# Patient Record
Sex: MALE | Race: WHITE | NOT HISPANIC OR LATINO | Employment: FULL TIME | RURAL
[De-identification: names, ages, dates, MRNs, and addresses within clinical notes are randomized per-mention and may not be internally consistent; named-entity substitution may affect disease eponyms.]

---

## 2020-09-14 ENCOUNTER — HISTORICAL (OUTPATIENT)
Dept: ADMINISTRATIVE | Facility: HOSPITAL | Age: 55
End: 2020-09-14

## 2022-03-24 ENCOUNTER — OFFICE VISIT (OUTPATIENT)
Dept: FAMILY MEDICINE | Facility: CLINIC | Age: 57
End: 2022-03-24
Payer: COMMERCIAL

## 2022-03-24 VITALS
OXYGEN SATURATION: 96 % | SYSTOLIC BLOOD PRESSURE: 132 MMHG | HEIGHT: 76 IN | WEIGHT: 254 LBS | TEMPERATURE: 98 F | HEART RATE: 54 BPM | BODY MASS INDEX: 30.93 KG/M2 | RESPIRATION RATE: 20 BRPM | DIASTOLIC BLOOD PRESSURE: 88 MMHG

## 2022-03-24 DIAGNOSIS — J06.9 UPPER RESPIRATORY TRACT INFECTION, UNSPECIFIED TYPE: Primary | ICD-10-CM

## 2022-03-24 DIAGNOSIS — H67.2 OTITIS MEDIA OF LEFT EAR IN DISEASE CLASSIFIED ELSEWHERE: ICD-10-CM

## 2022-03-24 DIAGNOSIS — J02.9 SORE THROAT: ICD-10-CM

## 2022-03-24 PROBLEM — H66.92 LEFT OTITIS MEDIA: Status: ACTIVE | Noted: 2022-03-24

## 2022-03-24 LAB
CTP QC/QA: YES
S PYO RRNA THROAT QL PROBE: NEGATIVE

## 2022-03-24 PROCEDURE — 99213 PR OFFICE/OUTPT VISIT, EST, LEVL III, 20-29 MIN: ICD-10-PCS | Mod: 25,,, | Performed by: NURSE PRACTITIONER

## 2022-03-24 PROCEDURE — 87880 POCT RAPID STREP A: ICD-10-PCS | Mod: QW,,, | Performed by: NURSE PRACTITIONER

## 2022-03-24 PROCEDURE — 3075F SYST BP GE 130 - 139MM HG: CPT | Mod: ,,, | Performed by: NURSE PRACTITIONER

## 2022-03-24 PROCEDURE — 87880 STREP A ASSAY W/OPTIC: CPT | Mod: QW,,, | Performed by: NURSE PRACTITIONER

## 2022-03-24 PROCEDURE — 3008F PR BODY MASS INDEX (BMI) DOCUMENTED: ICD-10-PCS | Mod: ,,, | Performed by: NURSE PRACTITIONER

## 2022-03-24 PROCEDURE — 96372 THER/PROPH/DIAG INJ SC/IM: CPT | Mod: ,,, | Performed by: NURSE PRACTITIONER

## 2022-03-24 PROCEDURE — 3008F BODY MASS INDEX DOCD: CPT | Mod: ,,, | Performed by: NURSE PRACTITIONER

## 2022-03-24 PROCEDURE — 99213 OFFICE O/P EST LOW 20 MIN: CPT | Mod: 25,,, | Performed by: NURSE PRACTITIONER

## 2022-03-24 PROCEDURE — 3079F PR MOST RECENT DIASTOLIC BLOOD PRESSURE 80-89 MM HG: ICD-10-PCS | Mod: ,,, | Performed by: NURSE PRACTITIONER

## 2022-03-24 PROCEDURE — 1159F PR MEDICATION LIST DOCUMENTED IN MEDICAL RECORD: ICD-10-PCS | Mod: ,,, | Performed by: NURSE PRACTITIONER

## 2022-03-24 PROCEDURE — 3079F DIAST BP 80-89 MM HG: CPT | Mod: ,,, | Performed by: NURSE PRACTITIONER

## 2022-03-24 PROCEDURE — 3075F PR MOST RECENT SYSTOLIC BLOOD PRESS GE 130-139MM HG: ICD-10-PCS | Mod: ,,, | Performed by: NURSE PRACTITIONER

## 2022-03-24 PROCEDURE — 1159F MED LIST DOCD IN RCRD: CPT | Mod: ,,, | Performed by: NURSE PRACTITIONER

## 2022-03-24 PROCEDURE — 1160F RVW MEDS BY RX/DR IN RCRD: CPT | Mod: ,,, | Performed by: NURSE PRACTITIONER

## 2022-03-24 PROCEDURE — 96372 PR INJECTION,THERAP/PROPH/DIAG2ST, IM OR SUBCUT: ICD-10-PCS | Mod: ,,, | Performed by: NURSE PRACTITIONER

## 2022-03-24 PROCEDURE — 1160F PR REVIEW ALL MEDS BY PRESCRIBER/CLIN PHARMACIST DOCUMENTED: ICD-10-PCS | Mod: ,,, | Performed by: NURSE PRACTITIONER

## 2022-03-24 RX ORDER — CEFTRIAXONE 1 G/1
1 INJECTION, POWDER, FOR SOLUTION INTRAMUSCULAR; INTRAVENOUS
Status: COMPLETED | OUTPATIENT
Start: 2022-03-24 | End: 2022-03-24

## 2022-03-24 RX ORDER — AZITHROMYCIN 250 MG/1
TABLET, FILM COATED ORAL
Qty: 6 TABLET | Refills: 0 | Status: SHIPPED | OUTPATIENT
Start: 2022-03-24 | End: 2022-03-29

## 2022-03-24 RX ORDER — TRAVOPROST OPHTHALMIC SOLUTION 0.04 MG/ML
SOLUTION OPHTHALMIC
COMMUNITY
Start: 2022-02-28

## 2022-03-24 RX ORDER — DEXAMETHASONE SODIUM PHOSPHATE 4 MG/ML
4 INJECTION, SOLUTION INTRA-ARTICULAR; INTRALESIONAL; INTRAMUSCULAR; INTRAVENOUS; SOFT TISSUE
Status: COMPLETED | OUTPATIENT
Start: 2022-03-24 | End: 2022-03-24

## 2022-03-24 RX ADMIN — DEXAMETHASONE SODIUM PHOSPHATE 4 MG: 4 INJECTION, SOLUTION INTRA-ARTICULAR; INTRALESIONAL; INTRAMUSCULAR; INTRAVENOUS; SOFT TISSUE at 10:03

## 2022-03-24 RX ADMIN — CEFTRIAXONE 1 G: 1 INJECTION, POWDER, FOR SOLUTION INTRAMUSCULAR; INTRAVENOUS at 10:03

## 2022-03-28 NOTE — PROGRESS NOTES
LYUDMILA Le   [address] [address]  [phone]      PATIENT NAME: Javier Hinojosa  : 1965  DATE: 3/24/22  MRN: [id number]      Billing Provider: LYUDMILA Le  Level of Service:   Patient PCP Information     Provider PCP Type    LYUDMILA [facility]          Reason for Visit / Chief Complaint: Sore Throat (Sore throat since Tuesday night.  Thinks he has strep.)       Update PCP  Update Chief Complaint         History of Present Illness / Problem Focused Workflow       57 year old male presents with complaints of sore throat, head congestion for about 2-3 days ago  Denies any known fever  Reports redness in eyes related to drop he uses at bedtime        Review of Systems     Review of Systems   Constitutional: Positive for fatigue. Negative for chills and fever.   HENT: Positive for congestion and sore throat. Negative for ear pain.    Eyes: Positive for redness.   Respiratory: Positive for cough. Negative for shortness of breath.    Cardiovascular: Negative for chest pain.   Gastrointestinal: Negative for abdominal pain, diarrhea and nausea.   Musculoskeletal: Negative for gait problem.   Allergic/Immunologic: Negative for environmental allergies.   Neurological: Negative for dizziness, weakness and headaches.   Psychiatric/Behavioral: Negative for dysphoric mood. The patient is not nervous/anxious.        Medical / Social / Family History     Past Medical History:   Diagnosis Date    Glaucoma        Past Surgical History:   Procedure Laterality Date    SHOULDER SURGERY Right        Social History    reports that he has never smoked. He uses smokeless tobacco. He reports current alcohol use. He reports previous drug use.    Family History  's family history includes Cancer in his mother; Diabetes in his father; Hypertension in his father.    Medications and Allergies     Medications  Outpatient Medications Marked as  Taking for the 3/24/22 encounter (Office Visit) with LYUDMILA Le   Medication Sig Dispense Refill    travoprost (TRAVATAN Z) 0.004 % ophthalmic solution INSERT 1 DROP INTO AFFECTED EYE(S) EVERY EVENING         Allergies  Review of patient's allergies indicates:  No Known Allergies    Physical Examination     Vitals:    03/24/22 0921   BP: 132/88   Pulse: (!) 54   Resp: 20   Temp: 97.9 °F (36.6 °C)     Physical Exam  Constitutional:       General: He is not in acute distress.  HENT:      Head: Normocephalic.      Right Ear: Tympanic membrane normal.      Left Ear: Tympanic membrane normal.      Nose: Congestion present.      Mouth/Throat:      Mouth: Mucous membranes are moist.      Pharynx: Posterior oropharyngeal erythema present.   Eyes:      Extraocular Movements: Extraocular movements intact.   Cardiovascular:      Rate and Rhythm: Tachycardia present.   Pulmonary:      Effort: Pulmonary effort is normal. No respiratory distress.   Abdominal:      General: Bowel sounds are normal.      Palpations: Abdomen is soft.   Musculoskeletal:         General: Normal range of motion.      Cervical back: Normal range of motion.   Skin:     General: Skin is warm.   Neurological:      Mental Status: He is alert and oriented to person, place, and time.   Psychiatric:         Behavior: Behavior normal.           Imaging / Labs     Office Visit on 03/24/2022   Component Date Value Ref Range Status    Rapid Strep A Screen 03/24/2022 Negative  Negative Final     Acceptable 03/24/2022 Yes   Final     X-Ray Shoulder 2 or More Views Left  Narrative: Procedure: XOIC SHOULDER COMPLETE    Indication: Pain in left shoulder     Comparison: No comparisons.    Findings: 2 views of the left shoulder are submitted. No definite  osseous or soft tissue abnormality is identified.  Impression: Impression: Normal left shoulder    [facility] of service: [facility]    This report has been electronically signed by  Adrian Riojas      Assessment and Plan (including Health Maintenance)      Problem List  Smart Sets  Document Outside HM   :    Health Maintenance Due   Topic Date Due    Hepatitis C Screening  Never done    Lipid Panel  Never done    COVID-19 Vaccine (1) Never done    HIV Screening  Never done    TETANUS VACCINE  Never done    Colorectal Cancer Screening  Never done    Shingles Vaccine (1 of 2) Never done    Influenza Vaccine (1) Never done       Problem List Items Addressed This Visit        ENT    Upper respiratory tract infection - Primary    Relevant Medications    azithromycin (Z-CHRIS) 250 MG tablet    Left otitis media    Relevant Medications    azithromycin (Z-CHRIS) 250 MG tablet      Other Visit Diagnoses     Sore throat        Relevant Orders    POCT rapid strep A (Completed)        Treat for URI today. Increase fluids as tolerated  Follow up as needed or if symptoms do not improve within 72 hours  Pt voices understanding and agreement       Signature:  LYUDMILA Le  [address] [place] [address]  [phone]    Date of encounter: 3/24/22

## 2024-04-24 ENCOUNTER — TELEPHONE (OUTPATIENT)
Dept: FAMILY MEDICINE | Facility: CLINIC | Age: 59
End: 2024-04-24
Payer: COMMERCIAL

## 2024-04-24 ENCOUNTER — OFFICE VISIT (OUTPATIENT)
Dept: FAMILY MEDICINE | Facility: CLINIC | Age: 59
End: 2024-04-24
Payer: COMMERCIAL

## 2024-04-24 VITALS
SYSTOLIC BLOOD PRESSURE: 160 MMHG | RESPIRATION RATE: 18 BRPM | OXYGEN SATURATION: 98 % | BODY MASS INDEX: 31.88 KG/M2 | TEMPERATURE: 98 F | HEART RATE: 68 BPM | DIASTOLIC BLOOD PRESSURE: 120 MMHG | WEIGHT: 261.81 LBS | HEIGHT: 76 IN
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
## 2024-04-24 DIAGNOSIS — M25.512 ACUTE PAIN OF LEFT SHOULDER: ICD-10-CM

## 2024-04-24 PROCEDURE — 3077F SYST BP >= 140 MM HG: CPT | Mod: ,,, | Performed by: FAMILY MEDICINE

## 2024-04-24 PROCEDURE — 3080F DIAST BP >= 90 MM HG: CPT | Mod: ,,, | Performed by: FAMILY MEDICINE

## 2024-04-24 PROCEDURE — 1160F RVW MEDS BY RX/DR IN RCRD: CPT | Mod: ,,, | Performed by: FAMILY MEDICINE

## 2024-04-24 PROCEDURE — 3008F BODY MASS INDEX DOCD: CPT | Mod: ,,, | Performed by: FAMILY MEDICINE

## 2024-04-24 PROCEDURE — 99213 OFFICE O/P EST LOW 20 MIN: CPT | Mod: ,,, | Performed by: FAMILY MEDICINE

## 2024-04-24 PROCEDURE — 1159F MED LIST DOCD IN RCRD: CPT | Mod: ,,, | Performed by: FAMILY MEDICINE

## 2024-04-24 RX ORDER — TRAMADOL HYDROCHLORIDE 50 MG/1
50 TABLET ORAL EVERY 8 HOURS PRN
Qty: 21 TABLET | Refills: 0 | Status: SHIPPED | OUTPATIENT
Start: 2024-04-24 | End: 2024-05-01
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
## 2024-04-24 NOTE — TELEPHONE ENCOUNTER
Per provider I called patient with 1 week follow up appointment, for 05/01/24 at 10:20, advised we are working on getting MRI scheduled. Also offered to order Physical Therapy, patient declined at this time. He would like to wait and see what the MRI shows.

## 2024-05-03 ENCOUNTER — HOSPITAL ENCOUNTER (OUTPATIENT)
Dept: RADIOLOGY | Facility: HOSPITAL | Age: 59
Discharge: HOME | End: 2024-05-03
Attending: FAMILY MEDICINE
Payer: COMMERCIAL

## 2024-05-03 DIAGNOSIS — M25.512 ACUTE PAIN OF LEFT SHOULDER: ICD-10-CM

## 2024-05-03 PROCEDURE — 73221 MRI JOINT UPR EXTREM W/O DYE: CPT | Mod: TC,LT

## 2024-05-08 ENCOUNTER — OFFICE VISIT (OUTPATIENT)
Dept: FAMILY MEDICINE | Facility: CLINIC | Age: 59
End: 2024-05-08
Payer: COMMERCIAL

## 2024-05-08 VITALS
OXYGEN SATURATION: 97 % | BODY MASS INDEX: 31.25 KG/M2 | SYSTOLIC BLOOD PRESSURE: 130 MMHG | HEART RATE: 68 BPM | HEIGHT: 76 IN | TEMPERATURE: 99 F | RESPIRATION RATE: 18 BRPM | WEIGHT: 256.63 LBS | DIASTOLIC BLOOD PRESSURE: 98 MMHG

## 2024-05-08 DIAGNOSIS — M25.512 ACUTE PAIN OF LEFT SHOULDER: Primary | ICD-10-CM

## 2024-05-08 PROCEDURE — 3080F DIAST BP >= 90 MM HG: CPT | Mod: ,,, | Performed by: FAMILY MEDICINE

## 2024-05-08 PROCEDURE — 99213 OFFICE O/P EST LOW 20 MIN: CPT | Mod: ,,, | Performed by: FAMILY MEDICINE

## 2024-05-08 PROCEDURE — 1160F RVW MEDS BY RX/DR IN RCRD: CPT | Mod: ,,, | Performed by: FAMILY MEDICINE

## 2024-05-08 PROCEDURE — 3075F SYST BP GE 130 - 139MM HG: CPT | Mod: ,,, | Performed by: FAMILY MEDICINE

## 2024-05-08 PROCEDURE — 3008F BODY MASS INDEX DOCD: CPT | Mod: ,,, | Performed by: FAMILY MEDICINE

## 2024-05-08 PROCEDURE — 1159F MED LIST DOCD IN RCRD: CPT | Mod: ,,, | Performed by: FAMILY MEDICINE

## 2024-05-08 RX ORDER — HYDROCODONE BITARTRATE AND ACETAMINOPHEN 5; 325 MG/1; MG/1
1 TABLET ORAL EVERY 8 HOURS PRN
Qty: 15 TABLET | Refills: 0 | Status: CANCELLED | OUTPATIENT
Start: 2024-05-08 | End: 2024-05-13

## 2024-05-08 RX ORDER — HYDROCODONE BITARTRATE AND ACETAMINOPHEN 7.5; 325 MG/1; MG/1
1 TABLET ORAL EVERY 8 HOURS PRN
Qty: 15 TABLET | Refills: 0 | Status: SHIPPED | OUTPATIENT
Start: 2024-05-08 | End: 2024-05-13

## 2024-05-08 RX ORDER — TRAMADOL HYDROCHLORIDE 50 MG/1
50 TABLET ORAL EVERY 6 HOURS PRN
COMMUNITY

## 2024-05-08 NOTE — PROGRESS NOTES
Health Maintenance Due   Topic Date Due    Hepatitis C Screening  Never done    Lipid Panel  Never done    Pneumococcal Vaccines (Age 0-64) (1 of 2 - PCV) Never done    HIV Screening  Never done    TETANUS VACCINE  Never done    Hemoglobin A1c (Diabetic Prevention Screening)  Never done    Colorectal Cancer Screening  Never done    Shingles Vaccine (1 of 2) Never done    COVID-19 Vaccine (1 - 2023-24 season) Never done     I discussed care gaps with patient and he states he has a Healthy You in July and will get all his vaccines and blood work then.

## 2024-05-08 NOTE — PROGRESS NOTES
Valentin Gregory DO   [facility]  [address] [address] [address]  [phone]      PATIENT NAME: Javier Hinojosa  : 1965  DATE: 24  MRN: [id number]      Billing Provider: Valentin Gregory DO  Level of Service: [place] OFFICE/OUTPT VISIT, EST, LEVL III, 20-29 MIN  Patient PCP Information       Provider PCP Type    Valentin Gregory DO General            Reason for Visit / Chief Complaint: Follow-up (Patient is Javier higgins 58 y/o male here for a 1 week follow-up on Left shoulder pain. He reports he had his MRI done 2024. Patient would like to change his pain medication he states it is not working for the pain.) and Referral (Patient reports he needs a referral to ortho for his left shoulder.)       Update PCP  Update Chief Complaint         History of Present Illness / Problem Focused Workflow     Javier Hinojosa presents to the clinic with Follow-up (Patient is Javier higgins 58 y/o male here for a 1 week follow-up on Left shoulder pain. He reports he had his MRI done 2024. Patient would like to change his pain medication he states it is not working for the pain.) and Referral (Patient reports he needs a referral to ortho for his left shoulder.)     Follow-up  Associated symptoms include arthralgias. Pertinent negatives include no abdominal pain, chest pain, chills, congestion, diaphoresis, fever, headaches, nausea, rash, sore throat or vomiting.       HPI as noted.  Patient denies fevers, chills, palpitations, diaphoresis, dizziness and lightheadedness.    Review of Systems     Review of Systems   Constitutional:  Negative for chills, diaphoresis and fever.   HENT:  Negative for congestion and sore throat.    Respiratory:  Negative for shortness of breath.    Cardiovascular:  Negative for chest pain and palpitations.   Gastrointestinal:  Negative for abdominal pain, constipation, diarrhea, nausea and vomiting.   Genitourinary:  Negative  for dysuria and hematuria.   Musculoskeletal:  Positive for arthralgias.   Skin:  Negative for rash.   Neurological:  Negative for dizziness, light-headedness and headaches.       Medical / Social / Family History     Past Medical History:   Diagnosis Date    Glaucoma        Past Surgical History:   Procedure Laterality Date    ADENOIDECTOMY  1971    SHOULDER SURGERY Right     TONSILLECTOMY  1971       Social History  Mr. Hinojosa  reports that he has been smoking. He has never been exposed to tobacco smoke. His smokeless tobacco use includes snuff. He reports current alcohol use of about 2.0 - 3.0 standard drinks of alcohol per week. He reports that he does not use drugs.    Family History  Mr.'s Hinojosa family history includes Arthritis in his father, maternal grandfather, and maternal grandmother; Cancer in his mother; Diabetes in his father and paternal grandmother; Hypertension in his father.    Medications and Allergies     Medications  Outpatient Medications Marked as Taking for the 5/8/24 encounter (Office Visit) with Valentin Gregory, DO   Medication Sig Dispense Refill    traMADoL (ULTRAM) 50 mg tablet Take 50 mg by mouth every 6 (six) hours as needed for Pain.      travoprost (TRAVATAN Z) 0.004 % ophthalmic solution INSERT 1 DROP INTO AFFECTED EYE(S) EVERY EVENING         Allergies  Review of patient's allergies indicates:  No Known Allergies    Physical Examination     Vitals:    05/08/24 1027   BP: (!) 130/98   Pulse:    Resp:    Temp:      Physical Exam  Constitutional:       General: He is not in acute distress.     Appearance: He is not ill-appearing, toxic-appearing or diaphoretic.   HENT:      Head: Normocephalic and atraumatic.      Right Ear: External ear normal.      Left Ear: External ear normal.      Nose: No congestion or rhinorrhea.      Mouth/Throat:      Mouth: Mucous membranes are moist.      Pharynx: No oropharyngeal exudate or posterior oropharyngeal erythema.   Eyes:      General: No  scleral icterus.        Right eye: No discharge.         Left eye: No discharge.      Pupils: Pupils are equal, round, and reactive to light.   Cardiovascular:      Rate and Rhythm: Normal rate.      Heart sounds: No murmur heard.     No friction rub. No gallop.   Pulmonary:      Effort: No respiratory distress.      Breath sounds: No stridor. No wheezing, rhonchi or rales.   Abdominal:      General: There is no distension.      Tenderness: There is no abdominal tenderness. There is no guarding.   Musculoskeletal:         General: No swelling or deformity.      Left shoulder: Decreased range of motion.      Right lower leg: No edema.      Left lower leg: No edema.   Neurological:      General: No focal deficit present.      Mental Status: He is alert and oriented to person, place, and time.         Assessment and Plan (including Health Maintenance)      Problem List  Smart Sets  Document Outside HM   :    Plan:         Health Maintenance Due   Topic Date Due    Hepatitis C Screening  Never done    Lipid Panel  Never done    Pneumococcal Vaccines (Age 0-64) (1 of 2 - PCV) Never done    HIV Screening  Never done    TETANUS VACCINE  Never done    Hemoglobin A1c (Diabetic Prevention Screening)  Never done    Colorectal Cancer Screening  Never done    Shingles Vaccine (1 of 2) Never done    COVID-19 Vaccine (1 - 2023-24 season) Never done       Problem List Items Addressed This Visit    None  Visit Diagnoses       Acute pain of left shoulder    -  Primary    Relevant Medications    HYDROcodone-acetaminophen (NORCO) 7.5-325 mg per tablet    Other Relevant Orders    Ambulatory referral/consult to Orthopedics   Patient reports that tramadol is not controlling his pain.  Patient given a short course of Norco for relief.  Patient is requesting referral to .  Patient has appointment scheduled for this coming Monday, May 13.  Patient counseled at length to call, return to clinic or present to the ED should symptoms  persist or worsen.  Patient's signals understanding and agreement with the plan of care.     Patient's blood pressure noted to be elevated today.  Patient has been keeping blood pressure log at home.  Patient states that he is recently lost weight.  He would like to continue to pursue lifestyle modification before initiating medication.  Patient will continue to maintain log which he will bring to his next appointment in July.    Health Maintenance Topics with due status: Not Due       Topic Last Completion Date    Influenza Vaccine Not Due       Future Appointments   Date Time Provider Department Center   5/13/2024  3:20 PM Mauri Jordan MD [facility]   7/23/2024 10:40 AM Valentin Gregory DO [facility]            Signature:  Valentin Gregory DO  [facility] [address] [place] [address]  [phone]    Date of encounter: 5/8/24

## 2024-05-13 ENCOUNTER — OFFICE VISIT (OUTPATIENT)
Dept: ORTHOPEDICS | Facility: CLINIC | Age: 59
End: 2024-05-13
Payer: COMMERCIAL

## 2024-05-13 DIAGNOSIS — S46.012A TRAUMATIC COMPLETE TEAR OF LEFT ROTATOR CUFF, INITIAL ENCOUNTER: Primary | ICD-10-CM

## 2024-05-13 DIAGNOSIS — M25.512 ACUTE PAIN OF LEFT SHOULDER: ICD-10-CM

## 2024-05-13 PROCEDURE — 1159F MED LIST DOCD IN RCRD: CPT | Mod: S$GLB,,, | Performed by: ORTHOPAEDIC SURGERY

## 2024-05-13 PROCEDURE — 99204 OFFICE O/P NEW MOD 45 MIN: CPT | Mod: S$GLB,,, | Performed by: ORTHOPAEDIC SURGERY

## 2024-05-13 PROCEDURE — 99213 OFFICE O/P EST LOW 20 MIN: CPT | Mod: PBBFAC | Performed by: ORTHOPAEDIC SURGERY

## 2024-05-13 PROCEDURE — 1160F RVW MEDS BY RX/DR IN RCRD: CPT | Mod: S$GLB,,, | Performed by: ORTHOPAEDIC SURGERY
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
## 2024-05-16 DIAGNOSIS — S46.012A TRAUMATIC COMPLETE TEAR OF LEFT ROTATOR CUFF, INITIAL ENCOUNTER: ICD-10-CM

## 2024-05-16 DIAGNOSIS — Z01.811 PRE-OPERATIVE RESPIRATORY EXAMINATION: ICD-10-CM

## 2024-05-16 DIAGNOSIS — Z01.812 PRE-OPERATIVE LABORATORY EXAMINATION: ICD-10-CM

## 2024-05-16 DIAGNOSIS — Z01.810 PRE-OPERATIVE CARDIOVASCULAR EXAMINATION: Primary | ICD-10-CM

## 2024-05-20 ENCOUNTER — OFFICE VISIT (OUTPATIENT)
Dept: FAMILY MEDICINE | Facility: CLINIC | Age: 59
End: 2024-05-20
Payer: COMMERCIAL

## 2024-05-20 VITALS
HEART RATE: 71 BPM | SYSTOLIC BLOOD PRESSURE: 118 MMHG | OXYGEN SATURATION: 96 % | TEMPERATURE: 98 F | HEIGHT: 76 IN | BODY MASS INDEX: 30.81 KG/M2 | WEIGHT: 253 LBS | RESPIRATION RATE: 16 BRPM | DIASTOLIC BLOOD PRESSURE: 80 MMHG

## 2024-05-20 DIAGNOSIS — Z01.818 ENCOUNTER FOR PRE-OPERATIVE EXAMINATION: Primary | ICD-10-CM

## 2024-05-20 DIAGNOSIS — Z01.812 PRE-OPERATIVE LABORATORY EXAMINATION: ICD-10-CM

## 2024-05-20 LAB
ANION GAP SERPL CALCULATED.3IONS-SCNC: 9 MMOL/L (ref 7–16)
BASOPHILS # BLD AUTO: 0.03 K/UL (ref 0–0.2)
BASOPHILS NFR BLD AUTO: 0.5 % (ref 0–1)
BUN SERPL-MCNC: 14 MG/DL (ref 7–18)
BUN/CREAT SERPL: 13 (ref 6–20)
CALCIUM SERPL-MCNC: 9.5 MG/DL (ref 8.5–10.1)
CHLORIDE SERPL-SCNC: 105 MMOL/L (ref 98–107)
CO2 SERPL-SCNC: 27 MMOL/L (ref 21–32)
CREAT SERPL-MCNC: 1.05 MG/DL (ref 0.7–1.3)
DIFFERENTIAL METHOD BLD: ABNORMAL
EGFR (NO RACE VARIABLE) (RUSH/TITUS): 82 ML/MIN/1.73M2
EKG 12-LEAD: NORMAL
EOSINOPHIL # BLD AUTO: 0.13 K/UL (ref 0–0.5)
EOSINOPHIL NFR BLD AUTO: 2.3 % (ref 1–4)
ERYTHROCYTE [DISTWIDTH] IN BLOOD BY AUTOMATED COUNT: 13.2 % (ref 11.5–14.5)
GLUCOSE SERPL-MCNC: 88 MG/DL (ref 74–106)
HCT VFR BLD AUTO: 48.9 % (ref 40–54)
HGB BLD-MCNC: 15.6 G/DL (ref 13.5–18)
IMM GRANULOCYTES # BLD AUTO: 0.01 K/UL (ref 0–0.04)
IMM GRANULOCYTES NFR BLD: 0.2 % (ref 0–0.4)
LYMPHOCYTES # BLD AUTO: 1.29 K/UL (ref 1–4.8)
LYMPHOCYTES NFR BLD AUTO: 23.2 % (ref 27–41)
MCH RBC QN AUTO: 29.4 PG (ref 27–31)
MCHC RBC AUTO-ENTMCNC: 31.9 G/DL (ref 32–36)
MCV RBC AUTO: 92.3 FL (ref 80–96)
MONOCYTES # BLD AUTO: 0.34 K/UL (ref 0–0.8)
MONOCYTES NFR BLD AUTO: 6.1 % (ref 2–6)
MPC BLD CALC-MCNC: 11.8 FL (ref 9.4–12.4)
NEUTROPHILS # BLD AUTO: 3.77 K/UL (ref 1.8–7.7)
NEUTROPHILS NFR BLD AUTO: 67.7 % (ref 53–65)
NRBC # BLD AUTO: 0 X10E3/UL
NRBC, AUTO (.00): 0 %
PLATELET # BLD AUTO: 203 K/UL (ref 150–400)
POTASSIUM SERPL-SCNC: 4 MMOL/L (ref 3.5–5.1)
PR INTERVAL: 158
PRT AXES: NORMAL
QRS DURATION: 104
QT/QTC: NORMAL
RBC # BLD AUTO: 5.3 M/UL (ref 4.6–6.2)
SODIUM SERPL-SCNC: 137 MMOL/L (ref 136–145)
VENTRICULAR RATE: 58
WBC # BLD AUTO: 5.57 K/UL (ref 4.5–11)

## 2024-05-20 PROCEDURE — 3079F DIAST BP 80-89 MM HG: CPT | Mod: ,,, | Performed by: NURSE PRACTITIONER

## 2024-05-20 PROCEDURE — 1160F RVW MEDS BY RX/DR IN RCRD: CPT | Mod: ,,, | Performed by: NURSE PRACTITIONER

## 2024-05-20 PROCEDURE — 85025 COMPLETE CBC W/AUTO DIFF WBC: CPT | Mod: ,,, | Performed by: CLINICAL MEDICAL LABORATORY

## 2024-05-20 PROCEDURE — 93005 ELECTROCARDIOGRAM TRACING: CPT | Mod: ,,, | Performed by: NURSE PRACTITIONER

## 2024-05-20 PROCEDURE — 3008F BODY MASS INDEX DOCD: CPT | Mod: ,,, | Performed by: NURSE PRACTITIONER

## 2024-05-20 PROCEDURE — 1159F MED LIST DOCD IN RCRD: CPT | Mod: ,,, | Performed by: NURSE PRACTITIONER

## 2024-05-20 PROCEDURE — 3074F SYST BP LT 130 MM HG: CPT | Mod: ,,, | Performed by: NURSE PRACTITIONER

## 2024-05-20 PROCEDURE — 80048 BASIC METABOLIC PNL TOTAL CA: CPT | Mod: ,,, | Performed by: CLINICAL MEDICAL LABORATORY

## 2024-05-20 PROCEDURE — 99213 OFFICE O/P EST LOW 20 MIN: CPT | Mod: ,,, | Performed by: NURSE PRACTITIONER

## 2024-05-20 NOTE — ASSESSMENT & PLAN NOTE
According to NS QIP risk calculator patient presents below average risk of complications from surgery.

## 2024-05-20 NOTE — PROGRESS NOTES
Sudha Kate NP   [place] [id number]  [place]  [address]      PATIENT NAME: Javier Hinojosa  : 1965  DATE: 24  MRN: [id number]      Billing Provider: Sudha Kate NP  Level of Service: [place] OFFICE/OUTPT VISIT, EST, LEVL III, 20-29 MIN  Patient PCP Information       Provider PCP Type    Valentin Gregory DO General            Reason for Visit / Chief Complaint: Pre-op Exam (Surgical Clearance: Pt is scheduled to have a left rotator cuff repair surgery with Dr. Jordan on 2024.)         History of Present Illness / Problem Focused Workflow     59 year old male presents to clinic for preop exam/surgical clearance. He is scheduled to have a left rotator cuff repair surgery with Dr. Jordan on 2024.            Review of Systems     @Review of Systems   Constitutional:  Negative for activity change, appetite change, fatigue and fever.   HENT:  Negative for nasal congestion, ear pain, rhinorrhea, sinus pressure/congestion and sore throat.    Eyes:  Negative for pain, redness, visual disturbance and eye dryness.   Respiratory:  Negative for cough and shortness of breath.    Cardiovascular:  Negative for chest pain and leg swelling.   Gastrointestinal:  Negative for abdominal distention, abdominal pain, constipation and diarrhea.   Endocrine: Negative for cold intolerance, heat intolerance and polyuria.   Genitourinary:  Negative for bladder incontinence, dysuria, frequency and urgency.   Musculoskeletal:  Positive for arthralgias. Negative for gait problem and myalgias.   Integumentary:  Negative for color change, rash and wound.   Allergic/Immunologic: Negative for environmental allergies and food allergies.   Neurological:  Negative for dizziness, weakness, light-headedness and headaches.   Psychiatric/Behavioral:  Negative for behavioral problems and sleep disturbance.        Medical / Social / Family History     Past Medical History:   Diagnosis Date    Glaucoma         Past Surgical History:   Procedure Laterality Date    ADENOIDECTOMY  1971    SHOULDER SURGERY Right 2020    TONSILLECTOMY  1971       Medications and Allergies     Medications  Outpatient Medications Marked as Taking for the 5/20/24 encounter (Office Visit) with Sudha Kate NP   Medication Sig Dispense Refill    traMADoL (ULTRAM) 50 mg tablet Take 50 mg by mouth every 6 (six) hours as needed for Pain.      travoprost (TRAVATAN Z) 0.004 % ophthalmic solution INSERT 1 DROP INTO AFFECTED EYE(S) EVERY EVENING         Allergies  Review of patient's allergies indicates:  No Known Allergies    Physical Examination     Vitals:    05/20/24 1038   BP: 118/80   Pulse: 71   Resp: 16   Temp: 97.8 °F (36.6 °C)     Physical Exam  Vitals and nursing note reviewed.   HENT:      Head: Normocephalic.      Right Ear: Tympanic membrane normal.      Left Ear: Tympanic membrane normal.      Nose: Nose normal.      Mouth/Throat:      Mouth: Mucous membranes are moist.      Pharynx: Oropharynx is clear. No posterior oropharyngeal erythema.   Eyes:      Conjunctiva/sclera: Conjunctivae normal.   Cardiovascular:      Rate and Rhythm: Normal rate and regular rhythm.      Pulses: Normal pulses.      Heart sounds: Normal heart sounds.   Pulmonary:      Effort: Pulmonary effort is normal.      Breath sounds: Normal breath sounds.   Abdominal:      General: Abdomen is flat. Bowel sounds are normal. There is no distension.      Palpations: Abdomen is soft.   Musculoskeletal:         General: No swelling.      Left shoulder: Tenderness present. Decreased range of motion.      Cervical back: Normal range of motion.      Right lower leg: No edema.      Left lower leg: No edema.   Skin:     General: Skin is warm and dry.      Capillary Refill: Capillary refill takes less than 2 seconds.   Neurological:      Mental Status: He is alert. Mental status is at baseline.   Psychiatric:         Mood and Affect: Mood normal.         Behavior:  "Behavior normal.               No results found for: "WBC", "HGB", "HCT", "MCV", "PLT"     CMP  No results found for: "NA", "K", "CL", "CO2", "GLU", "BUN", "CREATININE", "CALCIUM", "PROT", "ALBUMIN", "BILITOT", "ALKPHOS", "AST", "ALT", "ANIONGAP", "EGFRNORACEVR"  Procedures   Assessment and Plan (including Health Maintenance)   :    Plan:     Problem List Items Addressed This Visit          Other    Encounter for pre-operative examination - Primary    Current Assessment & Plan     According to NS QIP risk calculator patient presents below average risk of complications from surgery.          Relevant Orders    POCT EKG 12-LEAD (Manually Resulted by Ordering Provider) (Completed)     Other Visit Diagnoses       Pre-operative laboratory examination                Health Maintenance Topics with due status: Not Due       Topic Last Completion Date    Influenza Vaccine Not Due       Future Appointments   Date Time Provider Department Center   7/23/2024 10:40 AM Valentin Gregory, DO [facility]        Health Maintenance Due   Topic Date Due    Hepatitis C Screening  Never done    Lipid Panel  Never done    HIV Screening  Never done    TETANUS VACCINE  Never done    Hemoglobin A1c (Diabetic Prevention Screening)  Never done    Colorectal Cancer Screening  Never done    Shingles Vaccine (1 of 2) Never done    COVID-19 Vaccine (1 - 2023-24 season) Never done          Signature:  Sudha Kate NP  [facility]  [address] [address] [place]  [address]    Date of encounter: 5/20/24    "

## 2024-05-28 ENCOUNTER — ANESTHESIA (OUTPATIENT)
Dept: SURGERY | Facility: HOSPITAL | Age: 59
End: 2024-05-28
Payer: COMMERCIAL

## 2024-05-28 ENCOUNTER — ANESTHESIA EVENT (OUTPATIENT)
Dept: SURGERY | Facility: HOSPITAL | Age: 59
End: 2024-05-28
Payer: COMMERCIAL

## 2024-05-28 ENCOUNTER — HOSPITAL ENCOUNTER (OUTPATIENT)
Facility: HOSPITAL | Age: 59
Discharge: HOME | End: 2024-05-28
Attending: ORTHOPAEDIC SURGERY | Admitting: ORTHOPAEDIC SURGERY
Payer: COMMERCIAL

## 2024-05-28 VITALS
HEIGHT: 77 IN | OXYGEN SATURATION: 97 % | BODY MASS INDEX: 29.52 KG/M2 | WEIGHT: 250 LBS | HEART RATE: 70 BPM | SYSTOLIC BLOOD PRESSURE: 108 MMHG | DIASTOLIC BLOOD PRESSURE: 70 MMHG | TEMPERATURE: 98 F | RESPIRATION RATE: 18 BRPM

## 2024-05-28 DIAGNOSIS — S46.012A TRAUMATIC COMPLETE TEAR OF LEFT ROTATOR CUFF: Primary | ICD-10-CM

## 2024-05-28 PROCEDURE — 37000008 HC ANESTHESIA 1ST 15 MINUTES: Performed by: ORTHOPAEDIC SURGERY

## 2024-05-28 PROCEDURE — 27000510 HC BLANKET BAIR HUGGER ANY SIZE: Performed by: ANESTHESIOLOGY

## 2024-05-28 PROCEDURE — 36000711: Performed by: ORTHOPAEDIC SURGERY

## 2024-05-28 PROCEDURE — 71000015 HC POSTOP RECOV 1ST HR: Performed by: ORTHOPAEDIC SURGERY

## 2024-05-28 PROCEDURE — D9220A PRA ANESTHESIA: Mod: ANES,,, | Performed by: ANESTHESIOLOGY

## 2024-05-28 PROCEDURE — 27000655: Performed by: ANESTHESIOLOGY

## 2024-05-28 PROCEDURE — 36000710: Performed by: ORTHOPAEDIC SURGERY

## 2024-05-28 PROCEDURE — 63600175 PHARM REV CODE 636 W HCPCS

## 2024-05-28 PROCEDURE — 25000003 PHARM REV CODE 250: Performed by: ANESTHESIOLOGY

## 2024-05-28 PROCEDURE — 29824 SHO ARTHRS SRG DSTL CLAVICLC: CPT | Mod: 51,LT,, | Performed by: ORTHOPAEDIC SURGERY

## 2024-05-28 PROCEDURE — 25000003 PHARM REV CODE 250

## 2024-05-28 PROCEDURE — 27000689 HC BLADE LARYNGOSCOPE ANY SIZE: Performed by: ANESTHESIOLOGY

## 2024-05-28 PROCEDURE — 37000009 HC ANESTHESIA EA ADD 15 MINS: Performed by: ORTHOPAEDIC SURGERY

## 2024-05-28 PROCEDURE — 27000165 HC TUBE, ETT CUFFED: Performed by: ANESTHESIOLOGY

## 2024-05-28 PROCEDURE — C1713 ANCHOR/SCREW BN/BN,TIS/BN: HCPCS | Performed by: ORTHOPAEDIC SURGERY

## 2024-05-28 PROCEDURE — 71000033 HC RECOVERY, INTIAL HOUR: Performed by: ORTHOPAEDIC SURGERY

## 2024-05-28 PROCEDURE — 29827 SHO ARTHRS SRG RT8TR CUF RPR: CPT | Mod: LT,,, | Performed by: ORTHOPAEDIC SURGERY

## 2024-05-28 PROCEDURE — 27202344 HC EYESHIELD: Performed by: ANESTHESIOLOGY

## 2024-05-28 PROCEDURE — 27000716 HC OXISENSOR PROBE, ANY SIZE: Performed by: ANESTHESIOLOGY

## 2024-05-28 PROCEDURE — 64415 NJX AA&/STRD BRCH PLXS IMG: CPT | Mod: 59,LT,, | Performed by: ANESTHESIOLOGY

## 2024-05-28 PROCEDURE — 27200750 HC INSULATED NEEDLE/ STIMUPLEX: Performed by: ANESTHESIOLOGY

## 2024-05-28 PROCEDURE — 27201423 OPTIME MED/SURG SUP & DEVICES STERILE SUPPLY: Performed by: ORTHOPAEDIC SURGERY

## 2024-05-28 PROCEDURE — D9220A PRA ANESTHESIA: Mod: CRNA,,,

## 2024-05-28 PROCEDURE — 25000003 PHARM REV CODE 250: Performed by: ORTHOPAEDIC SURGERY

## 2024-05-28 PROCEDURE — 63600175 PHARM REV CODE 636 W HCPCS: Performed by: ANESTHESIOLOGY

## 2024-05-28 PROCEDURE — 63600175 PHARM REV CODE 636 W HCPCS: Mod: JZ,JG | Performed by: ORTHOPAEDIC SURGERY

## 2024-05-28 PROCEDURE — 29822 SHO ARTHRS SRG LMTD DBRDMT: CPT | Mod: 59,51,LT, | Performed by: ORTHOPAEDIC SURGERY

## 2024-05-28 DEVICE — CORKSCREW FT, BC, SUTURETAPE, 4.75MM
Type: IMPLANTABLE DEVICE | Site: SHOULDER | Status: FUNCTIONAL
Brand: ARTHREX®

## 2024-05-28 RX ORDER — PROPOFOL 10 MG/ML
VIAL (ML) INTRAVENOUS
Status: DISCONTINUED | OUTPATIENT
Start: 2024-05-28 | End: 2024-05-28

## 2024-05-28 RX ORDER — HYDROMORPHONE HYDROCHLORIDE 2 MG/ML
0.5 INJECTION, SOLUTION INTRAMUSCULAR; INTRAVENOUS; SUBCUTANEOUS EVERY 5 MIN PRN
Status: DISCONTINUED | OUTPATIENT
Start: 2024-05-28 | End: 2024-05-28 | Stop reason: HOSPADM

## 2024-05-28 RX ORDER — MIDAZOLAM HYDROCHLORIDE 1 MG/ML
INJECTION INTRAMUSCULAR; INTRAVENOUS
Status: DISCONTINUED | OUTPATIENT
Start: 2024-05-28 | End: 2024-05-28

## 2024-05-28 RX ORDER — PROMETHAZINE HYDROCHLORIDE 25 MG/1
25 TABLET ORAL EVERY 6 HOURS PRN
Status: DISCONTINUED | OUTPATIENT
Start: 2024-05-28 | End: 2024-05-28 | Stop reason: HOSPADM

## 2024-05-28 RX ORDER — HYDROCODONE BITARTRATE AND ACETAMINOPHEN 5; 325 MG/1; MG/1
1 TABLET ORAL EVERY 4 HOURS PRN
Status: DISCONTINUED | OUTPATIENT
Start: 2024-05-28 | End: 2024-05-28 | Stop reason: HOSPADM

## 2024-05-28 RX ORDER — KETOROLAC TROMETHAMINE 30 MG/ML
INJECTION, SOLUTION INTRAMUSCULAR; INTRAVENOUS
Status: DISCONTINUED | OUTPATIENT
Start: 2024-05-28 | End: 2024-05-28

## 2024-05-28 RX ORDER — EPINEPHRINE 1 MG/ML
INJECTION, SOLUTION, CONCENTRATE INTRAVENOUS
Status: DISCONTINUED | OUTPATIENT
Start: 2024-05-28 | End: 2024-05-28 | Stop reason: HOSPADM

## 2024-05-28 RX ORDER — ONDANSETRON HYDROCHLORIDE 2 MG/ML
4 INJECTION, SOLUTION INTRAVENOUS DAILY PRN
Status: DISCONTINUED | OUTPATIENT
Start: 2024-05-28 | End: 2024-05-28 | Stop reason: HOSPADM

## 2024-05-28 RX ORDER — PHENYLEPHRINE HYDROCHLORIDE 10 MG/ML
INJECTION INTRAVENOUS
Status: DISCONTINUED | OUTPATIENT
Start: 2024-05-28 | End: 2024-05-28

## 2024-05-28 RX ORDER — SODIUM CHLORIDE, SODIUM LACTATE, POTASSIUM CHLORIDE, CALCIUM CHLORIDE 600; 310; 30; 20 MG/100ML; MG/100ML; MG/100ML; MG/100ML
INJECTION, SOLUTION INTRAVENOUS CONTINUOUS
Status: DISCONTINUED | OUTPATIENT
Start: 2024-05-28 | End: 2024-05-28 | Stop reason: HOSPADM

## 2024-05-28 RX ORDER — LIDOCAINE HYDROCHLORIDE 20 MG/ML
INJECTION, SOLUTION EPIDURAL; INFILTRATION; INTRACAUDAL; PERINEURAL
Status: DISCONTINUED | OUTPATIENT
Start: 2024-05-28 | End: 2024-05-28

## 2024-05-28 RX ORDER — DIPHENHYDRAMINE HYDROCHLORIDE 50 MG/ML
25 INJECTION INTRAMUSCULAR; INTRAVENOUS EVERY 6 HOURS PRN
Status: DISCONTINUED | OUTPATIENT
Start: 2024-05-28 | End: 2024-05-28 | Stop reason: HOSPADM

## 2024-05-28 RX ORDER — OXYCODONE HYDROCHLORIDE 5 MG/1
5 TABLET ORAL
Status: DISCONTINUED | OUTPATIENT
Start: 2024-05-28 | End: 2024-05-28 | Stop reason: HOSPADM

## 2024-05-28 RX ORDER — GLYCOPYRROLATE 0.2 MG/ML
INJECTION INTRAMUSCULAR; INTRAVENOUS
Status: DISCONTINUED | OUTPATIENT
Start: 2024-05-28 | End: 2024-05-28

## 2024-05-28 RX ORDER — HYDROCODONE BITARTRATE AND ACETAMINOPHEN 7.5; 325 MG/15ML; MG/15ML
SOLUTION ORAL EVERY 6 HOURS PRN
COMMUNITY

## 2024-05-28 RX ORDER — ACETAMINOPHEN 500 MG
1000 TABLET ORAL EVERY 6 HOURS PRN
Status: DISCONTINUED | OUTPATIENT
Start: 2024-05-28 | End: 2024-05-28 | Stop reason: HOSPADM

## 2024-05-28 RX ORDER — SODIUM CHLORIDE 9 MG/ML
INJECTION, SOLUTION INTRAVENOUS CONTINUOUS
Status: DISCONTINUED | OUTPATIENT
Start: 2024-05-28 | End: 2024-05-28 | Stop reason: HOSPADM

## 2024-05-28 RX ORDER — BUPIVACAINE HYDROCHLORIDE 2.5 MG/ML
INJECTION, SOLUTION EPIDURAL; INFILTRATION; INTRACAUDAL
Status: DISCONTINUED | OUTPATIENT
Start: 2024-05-28 | End: 2024-05-28 | Stop reason: HOSPADM

## 2024-05-28 RX ORDER — MEPERIDINE HYDROCHLORIDE 25 MG/ML
25 INJECTION INTRAMUSCULAR; INTRAVENOUS; SUBCUTANEOUS EVERY 10 MIN PRN
Status: DISCONTINUED | OUTPATIENT
Start: 2024-05-28 | End: 2024-05-28 | Stop reason: HOSPADM

## 2024-05-28 RX ORDER — FENTANYL CITRATE 50 UG/ML
INJECTION, SOLUTION INTRAMUSCULAR; INTRAVENOUS
Status: DISCONTINUED | OUTPATIENT
Start: 2024-05-28 | End: 2024-05-28

## 2024-05-28 RX ORDER — DEXAMETHASONE SODIUM PHOSPHATE 4 MG/ML
INJECTION, SOLUTION INTRA-ARTICULAR; INTRALESIONAL; INTRAMUSCULAR; INTRAVENOUS; SOFT TISSUE
Status: DISCONTINUED | OUTPATIENT
Start: 2024-05-28 | End: 2024-05-28

## 2024-05-28 RX ORDER — ROCURONIUM BROMIDE 10 MG/ML
INJECTION, SOLUTION INTRAVENOUS
Status: DISCONTINUED | OUTPATIENT
Start: 2024-05-28 | End: 2024-05-28

## 2024-05-28 RX ORDER — ONDANSETRON 4 MG/1
8 TABLET, ORALLY DISINTEGRATING ORAL EVERY 8 HOURS PRN
Status: DISCONTINUED | OUTPATIENT
Start: 2024-05-28 | End: 2024-05-28 | Stop reason: HOSPADM

## 2024-05-28 RX ORDER — MORPHINE SULFATE 10 MG/ML
4 INJECTION INTRAMUSCULAR; INTRAVENOUS; SUBCUTANEOUS EVERY 5 MIN PRN
Status: DISCONTINUED | OUTPATIENT
Start: 2024-05-28 | End: 2024-05-28 | Stop reason: HOSPADM

## 2024-05-28 RX ORDER — ROPIVACAINE HYDROCHLORIDE 7.5 MG/ML
INJECTION, SOLUTION EPIDURAL; PERINEURAL
Status: COMPLETED | OUTPATIENT
Start: 2024-05-28 | End: 2024-05-28

## 2024-05-28 RX ORDER — EPHEDRINE SULFATE 50 MG/ML
INJECTION, SOLUTION INTRAVENOUS
Status: DISCONTINUED | OUTPATIENT
Start: 2024-05-28 | End: 2024-05-28

## 2024-05-28 RX ORDER — HYDROCODONE BITARTRATE AND ACETAMINOPHEN 10; 325 MG/1; MG/1
1 TABLET ORAL EVERY 4 HOURS PRN
Status: DISCONTINUED | OUTPATIENT
Start: 2024-05-28 | End: 2024-05-28 | Stop reason: HOSPADM

## 2024-05-28 RX ORDER — HYDROCODONE BITARTRATE AND ACETAMINOPHEN 10; 325 MG/1; MG/1
1 TABLET ORAL EVERY 6 HOURS PRN
Qty: 28 TABLET | Refills: 0 | Status: SHIPPED | OUTPATIENT
Start: 2024-05-28

## 2024-05-28 RX ORDER — SODIUM CHLORIDE, SODIUM LACTATE, POTASSIUM CHLORIDE, CALCIUM CHLORIDE 600; 310; 30; 20 MG/100ML; MG/100ML; MG/100ML; MG/100ML
125 INJECTION, SOLUTION INTRAVENOUS CONTINUOUS
Status: DISCONTINUED | OUTPATIENT
Start: 2024-05-28 | End: 2024-05-28 | Stop reason: HOSPADM

## 2024-05-28 RX ORDER — VASOPRESSIN 20 [USP'U]/ML
INJECTION, SOLUTION INTRAMUSCULAR; SUBCUTANEOUS
Status: DISCONTINUED | OUTPATIENT
Start: 2024-05-28 | End: 2024-05-28

## 2024-05-28 RX ORDER — CEFAZOLIN SODIUM 1 G/3ML
INJECTION, POWDER, FOR SOLUTION INTRAMUSCULAR; INTRAVENOUS
Status: DISCONTINUED | OUTPATIENT
Start: 2024-05-28 | End: 2024-05-28

## 2024-05-28 RX ORDER — LIDOCAINE HYDROCHLORIDE 10 MG/ML
1 INJECTION INFILTRATION; PERINEURAL ONCE
Status: DISCONTINUED | OUTPATIENT
Start: 2024-05-28 | End: 2024-05-28 | Stop reason: HOSPADM

## 2024-05-28 RX ORDER — ONDANSETRON HYDROCHLORIDE 2 MG/ML
INJECTION, SOLUTION INTRAVENOUS
Status: DISCONTINUED | OUTPATIENT
Start: 2024-05-28 | End: 2024-05-28

## 2024-05-28 RX ADMIN — MIDAZOLAM HYDROCHLORIDE 2 MG: 1 INJECTION, SOLUTION INTRAMUSCULAR; INTRAVENOUS at 10:05

## 2024-05-28 RX ADMIN — PROPOFOL 200 MG: 10 INJECTION, EMULSION INTRAVENOUS at 10:05

## 2024-05-28 RX ADMIN — LIDOCAINE HYDROCHLORIDE 40 MG: 20 INJECTION, SOLUTION INTRAVENOUS at 10:05

## 2024-05-28 RX ADMIN — SODIUM CHLORIDE: 9 INJECTION, SOLUTION INTRAVENOUS at 08:05

## 2024-05-28 RX ADMIN — ROCURONIUM BROMIDE 50 MG: 10 INJECTION, SOLUTION INTRAVENOUS at 10:05

## 2024-05-28 RX ADMIN — FENTANYL CITRATE 100 MCG: 50 INJECTION INTRAMUSCULAR; INTRAVENOUS at 10:05

## 2024-05-28 RX ADMIN — SODIUM CHLORIDE: 9 INJECTION, SOLUTION INTRAVENOUS at 12:05

## 2024-05-28 RX ADMIN — PHENYLEPHRINE HYDROCHLORIDE 100 MCG: 10 INJECTION INTRAVENOUS at 11:05

## 2024-05-28 RX ADMIN — DEXAMETHASONE SODIUM PHOSPHATE 8 MG: 4 INJECTION, SOLUTION INTRA-ARTICULAR; INTRALESIONAL; INTRAMUSCULAR; INTRAVENOUS; SOFT TISSUE at 11:05

## 2024-05-28 RX ADMIN — CEFAZOLIN 2 G: 1 INJECTION, POWDER, FOR SOLUTION INTRAMUSCULAR; INTRAVENOUS; PARENTERAL at 11:05

## 2024-05-28 RX ADMIN — VASOPRESSIN 2 UNITS: 20 INJECTION INTRAVENOUS at 11:05

## 2024-05-28 RX ADMIN — ROPIVACAINE HYDROCHLORIDE 30 ML: 7.5 INJECTION, SOLUTION EPIDURAL; PERINEURAL at 11:05

## 2024-05-28 RX ADMIN — EPHEDRINE SULFATE 25 MG: 50 INJECTION INTRAVENOUS at 11:05

## 2024-05-28 RX ADMIN — VASOPRESSIN 1 UNITS: 20 INJECTION INTRAVENOUS at 11:05

## 2024-05-28 RX ADMIN — ONDANSETRON 8 MG: 2 INJECTION INTRAMUSCULAR; INTRAVENOUS at 11:05

## 2024-05-28 RX ADMIN — VASOPRESSIN 1 UNITS: 20 INJECTION INTRAVENOUS at 12:05

## 2024-05-28 RX ADMIN — KETOROLAC TROMETHAMINE 60 MG: 30 INJECTION, SOLUTION INTRAMUSCULAR at 11:05

## 2024-05-28 RX ADMIN — SODIUM CHLORIDE: 9 INJECTION, SOLUTION INTRAVENOUS at 10:05

## 2024-05-28 RX ADMIN — SUGAMMADEX 200 MG: 100 INJECTION, SOLUTION INTRAVENOUS at 12:05

## 2024-05-28 RX ADMIN — GLYCOPYRROLATE 0.2 MG: 0.2 INJECTION INTRAMUSCULAR; INTRAVENOUS at 12:05

## 2024-05-28 NOTE — ANESTHESIA PROCEDURE NOTES
Intubation    Date/Time: 5/28/2024 11:06 AM    Performed by: Sharad Saenz CRNA  Authorized by: Mac Perez MD    Intubation:     Induction:  Intravenous    Intubated:  Postinduction    Mask Ventilation:  Easy mask    Attempts:  1    Attempted By:  CRNA    Method of Intubation:  Direct    Blade:  Ramsey 2    Laryngeal View Grade: Grade I - full view of cords      Difficult Airway Encountered?: No      Complications:  None    Airway Device:  Oral endotracheal tube    Airway Device Size:  7.5    Style/Cuff Inflation:  Cuffed (inflated to minimal occlusive pressure)    Inflation Amount (mL):  8    Tube secured:  23    Secured at:  The teeth    Placement Verified By:  Capnometry    Complicating Factors:  None    Findings Post-Intubation:  BS equal bilateral and atraumatic/condition of teeth unchanged

## 2024-05-28 NOTE — ANESTHESIA PROCEDURE NOTES
Peripheral Block    Patient location during procedure: OR   Block not for primary anesthetic.  Reason for block: at surgeon's request and post-op pain management   Post-op Pain Location: lt shoulder pain, p op   Start time: 5/28/2024 11:01 AM  Timeout: 5/28/2024 11:00 AM   End time: 5/28/2024 11:04 AM    Staffing  Authorizing Provider: Mac Perez MD  Performing Provider: Mac Perez MD    Staffing  Performed by: Mac Perez MD  Authorized by: Mac Perez MD    Preanesthetic Checklist  Completed: patient identified, IV checked, site marked, risks and benefits discussed, surgical consent, monitors and equipment checked, pre-op evaluation and timeout performed  Peripheral Block  Patient position: sitting  Prep: ChloraPrep  Patient monitoring: heart rate, continuous pulse ox, continuous capnometry, frequent blood pressure checks and cardiac monitor  Block type: interscalene  Laterality: left  Injection technique: single shot  Needle  Needle type: Stimuplex   Needle gauge: 20 G  Needle length: 2 in  Needle localization: nerve stimulator and ultrasound guidance   -ultrasound image captured on disc.  Assessment  Injection assessment: negative aspiration, negative parasthesia and local visualized surrounding nerve  Paresthesia pain: none  Heart rate change: no  Slow fractionated injection: yes  Pain Tolerance: comfortable throughout block  Medications:    Medications: ROPIvacaine (NAROPIN) injection 0.75% - Perineural   30 mL - 5/28/2024 11:02:00 AM

## 2024-05-28 NOTE — ANESTHESIA POSTPROCEDURE EVALUATION
Anesthesia Post Evaluation    Patient: Javier Hinojosa    Procedure(s) Performed: Procedure(s) (LRB):  ARTHROSCOPY, SHOULDER (Left)  DEBRIDEMENT, SHOULDER, ARTHROSCOPIC (Left)  ACROMIOPLASTY, ARTHROSCOPIC (Left)  EXCISION, CLAVICLE, DISTAL (Left)  REPAIR, ROTATOR CUFF (Left)    Final Anesthesia Type: general      Patient location during evaluation: PACU  Patient participation: Yes- Able to Participate  Level of consciousness: awake and sedated  Post-procedure vital signs: reviewed and stable  Pain management: adequate  Airway patency: patent    PONV status at discharge: No PONV  Anesthetic complications: no      Cardiovascular status: blood pressure returned to baseline  Respiratory status: unassisted  Hydration status: euvolemic  Follow-up not needed.              Vitals Value Taken Time   /83 05/28/24 1401   Temp 36.4 °C (97.6 °F) 05/28/24 1318   Pulse 64 05/28/24 1409   Resp 16 05/28/24 1350   SpO2 98 % 05/28/24 1409   Vitals shown include unfiled device data.      Event Time   Out of Recovery 13:47:00         Pain/Hair Score: Hair Score: 10 (5/28/2024  1:50 PM)

## 2024-05-28 NOTE — INTERVAL H&P NOTE
The patient has been examined and the H&P has been reviewed:  I concur with the findings and no changes have occurred since H&P was written.    Surgery risks, benefits and alternative options discussed and understood by patient/family.          Active Hospital Problems    Diagnosis  POA    *Traumatic complete tear of left rotator cuff [S46.012A]  Yes      Resolved Hospital Problems   No resolved problems to display.

## 2024-05-28 NOTE — ANESTHESIA PREPROCEDURE EVALUATION
05/28/2024  Javier Hinojosa is a 59 y.o., male.      Pre-op Assessment    I have reviewed the Patient Summary Reports.     I have reviewed the Nursing Notes. I have reviewed the NPO Status.   I have reviewed the Medications.     Review of Systems  Anesthesia Hx:  No problems with previous Anesthesia                Social:  Non-Smoker, No Alcohol Use       Hematology/Oncology:  Hematology Normal   Oncology Normal                                   EENT/Dental:  EENT/Dental Normal           Cardiovascular:  Cardiovascular Normal                                            Pulmonary:  Pulmonary Normal                       Renal/:  Renal/ Normal                 Hepatic/GI:  Hepatic/GI Normal                 Musculoskeletal:  Musculoskeletal Normal                Neurological:  Neurology Normal                                      Endocrine:  Endocrine Normal            Dermatological:  Skin Normal    Psych:  Psychiatric Normal                    Physical Exam  General: Well nourished    Airway:  Mallampati: II / II  Mouth Opening: Normal  TM Distance: > 6 cm  Tongue: Normal  Neck ROM: Normal ROM    Chest/Lungs:  Clear to auscultation, Normal Respiratory Rate    Heart:  Rate: Normal  Rhythm: Regular Rhythm        Anesthesia Plan  Type of Anesthesia, risks & benefits discussed:    Anesthesia Type: Gen ETT, Regional  Intra-op Monitoring Plan: Standard ASA Monitors  Post Op Pain Control Plan: multimodal analgesia  Induction:  IV  Informed Consent: Informed consent signed with the Patient and all parties understand the risks and agree with anesthesia plan.  All questions answered. Patient consented to blood products? Yes  ASA Score: 2  Day of Surgery Review of History & Physical: H&P Update referred to the surgeon/provider.I have interviewed and examined the patient. I have reviewed the patient's H&P dated: There  are no significant changes.     Ready For Surgery From Anesthesia Perspective.     .

## 2024-05-28 NOTE — BRIEF OP NOTE
[facility] - Orthopedic Periop Services  Brief Operative Note    Surgery Date: 5/28/2024     Surgeons and Role:     * Mauri Jordan MD - Primary    Assisting Surgeon: None    Pre-op Diagnosis:  Traumatic complete tear of left rotator cuff, initial encounter [S46.012A]    Post-op Diagnosis:  Post-Op Diagnosis Codes:     * Traumatic complete tear of left rotator cuff, initial encounter [S46.012A]    Procedure(s) (LRB):  ARTHROSCOPY, SHOULDER (Left)  DEBRIDEMENT, SHOULDER, ARTHROSCOPIC (Left)  ACROMIOPLASTY, ARTHROSCOPIC (Left)  EXCISION, CLAVICLE, DISTAL (Left)  REPAIR, ROTATOR CUFF (Left)    Anesthesia: General/Regional    Description of the findings of the procedure(s): See Op Note     Estimated Blood Loss: * No values recorded between 5/28/2024 11:40 AM and 5/28/2024  1:16 PM *5cc         Specimens:   Specimen (24h ago, onward)      None              Discharge Note    OUTCOME: Patient tolerated treatment/procedure well without complication and is now ready for discharge.    DISPOSITION: Home or Self Care    FINAL DIAGNOSIS:  Traumatic complete tear of left rotator cuff    FOLLOWUP: In clinic    DISCHARGE INSTRUCTIONS:    Discharge Procedure Orders   Diet general     Keep surgical extremity elevated     Ice to affected area   Order Comments: using barrier between ice and skin (specify duration&frequency)     Change dressing (specify)   Order Comments: Dressing change: one time per day beginning 72 hours post op.     Call MD for:  temperature >100.4     Call MD for:  persistent nausea and vomiting     Call MD for:  severe uncontrolled pain     Call MD for:  difficulty breathing, headache or visual disturbances     Call MD for:  redness, tenderness, or signs of infection (pain, swelling, redness, odor or green/yellow discharge around incision site)     Call MD for:  hives     Call MD for:  persistent dizziness or light-headedness     Call MD for:  extreme fatigue     Activity as tolerated     Shower on  day dressing removed (No bath)     Weight bearing as tolerated

## 2024-05-28 NOTE — SUBJECTIVE & OBJECTIVE
Past Medical History:   Diagnosis Date    Glaucoma        Past Surgical History:   Procedure Laterality Date    ADENOIDECTOMY  1971    SHOULDER SURGERY Right 2020    TONSILLECTOMY  1971       Review of patient's allergies indicates:  No Known Allergies    No current facility-administered medications for this encounter.     Current Outpatient Medications   Medication Sig    traMADoL (ULTRAM) 50 mg tablet Take 50 mg by mouth every 6 (six) hours as needed for Pain.    travoprost (TRAVATAN Z) 0.004 % ophthalmic solution INSERT 1 DROP INTO AFFECTED EYE(S) EVERY EVENING     Family History       Problem Relation (Age of Onset)    Arthritis Father, Maternal Grandfather, Maternal Grandmother    Cancer Mother    Diabetes Father, Paternal Grandmother    Hypertension Father          Tobacco Use    Smoking status: Never     Passive exposure: Never    Smokeless tobacco: Current     Types: Snuff    Tobacco comments:     Smokeless tobacco off and on for 40 years   Substance and Sexual Activity    Alcohol use: Yes     Alcohol/week: 2.0 - 3.0 standard drinks of alcohol     Types: 1 - 2 Cans of beer, 1 Drinks containing 0.5 oz of alcohol per week     Comment: Do not drink but about 2x week    Drug use: Never    Sexual activity: Yes     Partners: Female     Birth control/protection: None     Review of Systems   Constitutional: Negative.     Objective:     Vital Signs (Most Recent):    Vital Signs (24h Range):  BP: ()/()   Arterial Line BP: ()/()            There is no height or weight on file to calculate BMI.    No intake or output data in the 24 hours ending 05/28/24 0702     General    Vitals reviewed.  Constitutional: He is oriented to person, place, and time. He appears well-developed and well-nourished.   HENT:   Head: Normocephalic and atraumatic.   Eyes: EOM are normal. Pupils are equal, round, and reactive to light.   Cardiovascular:  Normal rate, regular rhythm and normal heart sounds.            Pulmonary/Chest: Effort  normal and breath sounds normal.   Neurological: He is alert and oriented to person, place, and time.   Psychiatric: He has a normal mood and affect. His behavior is normal.         Right Shoulder Exam   Right shoulder exam is normal.    Left Shoulder Exam     Tenderness   The patient is tender to palpation of the acromioclavicular joint, acromion and greater tuberosity.    Range of Motion   Active abduction:  abnormal   Passive abduction:  normal   Extension:  normal   Adduction: normal    Tests & Signs   Impingement: positive  Rotator Cuff Painful Arc/Range: moderate    Other   Sensation: normal       Muscle Strength   Left Upper Extremity  Shoulder Abduction: 3/5     Vascular Exam       Left Pulses      Radial:                    2+      Capillary Refill  Left Hand: normal capillary refill           Significant Labs: All pertinent labs within the past 24 hours have been reviewed.    Significant Imaging: I have reviewed all pertinent imaging results/findings.
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
## 2024-05-28 NOTE — OR NURSING
1317 Rec'd pt to PACU asleep with oral airway in place. No signs of distress noted, VSS. Left shoulder dressing C/D/I with arm sling in place, cap refill less than 3 seconds, radial pulse 2+. No needs. Will continue to monitor.     1333 Oral airway removed, respirations even and unlabored. Reoriented to surroundings. No needs.     1347 Out of PACU. VSS. No signs of bleeding/distress noted.     1350 Pt to ASC 23 awake and alert. No signs of distress noted, respirations even and unlabored. Family at bedside. Bedside report given to LATISHA Silva RN. Left shoulder dressing C/D/I with arm sling in place, cap refill less than 3 seconds, radial pulse 2+, unable to wiggle fingers on command due to block. Denies pain/needs. /86, P 73, R 14, O2 96% RA.

## 2024-05-28 NOTE — OP NOTE
[facility] - [facility] Services  Surgery Department  Operative Note    SUMMARY     Date of Procedure: 5/28/2024     Procedure: Procedure(s) (LRB):  ARTHROSCOPY, SHOULDER (Left)  DEBRIDEMENT, SHOULDER, ARTHROSCOPIC (Left)  ACROMIOPLASTY, ARTHROSCOPIC (Left)  EXCISION, CLAVICLE, DISTAL (Left)  REPAIR, ROTATOR CUFF (Left)     Surgeons and Role:     * Mauri Jordan MD - Primary    Assisting Surgeon: None    Pre-Operative Diagnosis: Traumatic complete tear of left rotator cuff, initial encounter [S46.012A]    Post-Operative Diagnosis: Post-Op Diagnosis Codes:     * Traumatic complete tear of left rotator cuff, initial encounter [S46.012A]    Anesthesia: General/Regional    Technical Procedures Used:                           DEPARTMENT OF ORTHOPEDIC SURGERY                OPERATIVE REPORT     NAME:  Javier Hinojosa  MRN: [id number]     DATE OF SURGERY:  5/28/2024    PREOPERATIVE DIAGNOSIS: left shoulder internal derangement       POSTOPERATIVE DIAGNOSIS:  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  After adequate level of general anesthesia had been achieved (see anesthesia note) patients left shoulder was examined.  left shoulder normal contour.  There is full passive range of motion of the shoulder without instability signs.  left shoulder and upper extremity was scrubbed with Betadine and draped in sterile fashion.  The operation was begun by inflating the joint with sterile saline through a posterior approach using an 18 gauge spinal needle.  Now a linear skin incision was made over the anterior  aspect of the right shoulder for introduction of the blunt arthroscopy cannula.  Intraarticular positioning was confirmed with the arthroscopy camera. The anterior portal was established though an anterior incision made lateral to the coracoid process. The joint was entered through a trans-subscapularis muscle approach.  Inspection of the glenohumeral joint revealed grade 2/4 DJD involving the humeral head articular surface.  Mild degenerative tearing of the anterior labrum was present.  This was debrided with a shaver and contoured with the radiofrequency Wand.  There was partial-thickness tearing of the long head biceps tendon.  Large full-thickness retracted tear of the supraspinatus/infraspinatus tendons was identified.  Joint was irrigated antibiotic solution and the arthroscopy equipment was withdrawn.  The blunt cannula was redirected from the posterior portal superiorly into the subacromial space.  Anterior and lateral portals were established.  A bursectomy was performed with the shaver.  A large traumatic appearing in substance tear of the supraspinatus and infraspinatus tendons near its insertion was identified.  Now the coracoacromial ligaments taken down with the radiofrequency Wand.  Acromioplasty and Jodi distal clavicle resection was performed with a barrel bur.  Good subacromial decompression was confirmed with multiple portal viewing.  Incision was made to perform an open repair of the rotator cuff tendons.  Arthroscope was withdrawn.  A linear skin incision made over the anterior aspect of the shoulder.  Incision was carried carefully through subcutaneous layers.  Interval between anterior middle 1 thirds of the deltoid muscle was identified in developed gaining access to the subacromial space.  Rotator cuff tear was identified.  The edges were freshened.  Two Arthrex SwiveLock suture anchors were placed in the footprint of the rotator cuff insertion.  Rotator cuff tear was then approximated  using FiberWire suture in the 2 aforementioned suture anchors.  Good opposition of the rotator cuff tissue was achieved with good stability.  Subacromial space was irrigated antibiotic solution.  The deltoid muscle interval was approximated with interrupted 2-0 Vicryl suture.  Subcutaneous tissue was approximated with interrupted 2-0 Vicryl suture.  Skin margins approximated with stainless steel staples.  The wounds dressed sterilely and arm sling applied.  The patient was taken to the recovery room in satisfactory condition.  ESTIMATED BLOOD LOSS:  5ccs.  The patient received Ancef antibiotic intravenously prior to the procedure.      Mauri Jordan MD     Description of the Findings of the Procedure:  Acute retracted rotator cuff tear-left shoulder    Significant Surgical Tasks Conducted by the Assistant(s), if Applicable:     Complications: No    Estimated Blood Loss (EBL): * No values recorded between 5/28/2024 11:40 AM and 5/28/2024  1:16 PM *           Implants:   Implant Name Type Inv. Item Serial No.  Lot No. LRB No. Used Action   ANCHOR CORKSCREW FT [id number] - [id number]  ANCHOR CORKSCREW FT [id number]  [employer] [id number]  1 Implanted   ANCHOR [employer] FT [id number] - [id number]  ANCHOR CORKSCREW FT [id number]  [employer] [id number]  1 Implanted       Specimens:   Specimen (24h ago, onward)      None                    Condition: Good    Disposition: PACU - hemodynamically stable.    Attestation: I was present and scrubbed for the entire procedure.

## 2024-05-28 NOTE — TRANSFER OF CARE
"Anesthesia Transfer of Care Note    Patient: Javier Hinojosa    Procedure(s) Performed: Procedure(s) (LRB):  ARTHROSCOPY, SHOULDER (Left)  DEBRIDEMENT, SHOULDER, ARTHROSCOPIC (Left)  ACROMIOPLASTY, ARTHROSCOPIC (Left)  EXCISION, CLAVICLE, DISTAL (Left)  REPAIR, ROTATOR CUFF    Patient location: PACU    Anesthesia Type: general and regional    Transport from OR: Transported from OR on room air with adequate spontaneous ventilation    Post pain: adequate analgesia    Post assessment: no apparent anesthetic complications    Post vital signs: stable    Level of consciousness: awake, alert and oriented    Nausea/Vomiting: no nausea/vomiting    Complications: none    Transfer of care protocol was followedComments: Refer to nursing note for pain ed score upon discharge from recovery      Last vitals: Visit Vitals  BP (!) 151/93   Pulse 74   Temp 36.4 °C (97.6 °F)   Resp 11   Ht 6' 5" (1.956 m)   Wt 113.4 kg (250 lb)   SpO2 100%   BMI 29.65 kg/m²     "
Pt refusing AM medication/vitals/fingerstick
Pt refusing finger sticks, IV antibiotics to be given, blood pressure medications to be given, vitals to be taken, and BL LE wound skin assessment to be completed
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
## 2024-06-06 DIAGNOSIS — M25.512 ACUTE PAIN OF LEFT SHOULDER: Primary | ICD-10-CM

## 2024-06-07 ENCOUNTER — OFFICE VISIT (OUTPATIENT)
Dept: ORTHOPEDICS | Facility: CLINIC | Age: 59
End: 2024-06-07
Payer: COMMERCIAL

## 2024-06-07 ENCOUNTER — HOSPITAL ENCOUNTER (OUTPATIENT)
Dept: RADIOLOGY | Facility: HOSPITAL | Age: 59
Discharge: HOME | End: 2024-06-07
Payer: COMMERCIAL

## 2024-06-07 VITALS — WEIGHT: 250 LBS | BODY MASS INDEX: 29.52 KG/M2 | HEIGHT: 77 IN

## 2024-06-07 DIAGNOSIS — M25.512 ACUTE PAIN OF LEFT SHOULDER: ICD-10-CM

## 2024-06-07 DIAGNOSIS — Z98.890 STATUS POST LEFT ROTATOR CUFF REPAIR: Primary | ICD-10-CM

## 2024-06-07 PROCEDURE — 99024 POSTOP FOLLOW-UP VISIT: CPT | Mod: S$GLB,,,

## 2024-06-07 PROCEDURE — 99213 OFFICE O/P EST LOW 20 MIN: CPT | Mod: PBBFAC,25

## 2024-06-07 PROCEDURE — 1159F MED LIST DOCD IN RCRD: CPT | Mod: S$GLB,,,

## 2024-06-07 PROCEDURE — 73030 X-RAY EXAM OF SHOULDER: CPT | Mod: 26,LT,, | Performed by: RADIOLOGY

## 2024-06-07 PROCEDURE — 73030 X-RAY EXAM OF SHOULDER: CPT | Mod: TC,LT

## 2024-06-07 NOTE — PROGRESS NOTES
Arthroscopy, Shoulder - Left, Debridement, Shoulder, Arthroscopic - Left, Acromioplasty, Arthroscopic - Left, Excision, Clavicle, Distal - Left, and Repair, Rotator Cuff - Left 5/28/2024    Javier Hinojosa is a 59 y.o. male seen today for post-op visit.  Patient is 10 days postop from left shoulder arthroscopy with open rotator cuff repair by Dr. Jordan.  Patient states he is doing well today.  No complaints today.      PAST MEDICAL HISTORY:   Past Medical History:   Diagnosis Date    Glaucoma         PAST SURGICAL HISTORY:   Past Surgical History:   Procedure Laterality Date    ADENOIDECTOMY  1971    ARTHROSCOPIC ACROMIOPLASTY OF SHOULDER Left 5/28/2024    Procedure: ACROMIOPLASTY, ARTHROSCOPIC;  Surgeon: Mauri Jordan MD;  Location: [facility] OR;  Service: Orthopedics;  Laterality: Left;    ARTHROSCOPIC DEBRIDEMENT OF SHOULDER Left 5/28/2024    Procedure: DEBRIDEMENT, SHOULDER, ARTHROSCOPIC;  Surgeon: Mauri Jordan MD;  Location: [facility] OR;  Service: Orthopedics;  Laterality: Left;    DISTAL CLAVICLE EXCISION Left 5/28/2024    Procedure: EXCISION, CLAVICLE, DISTAL;  Surgeon: Mauri Jordan MD;  Location: [facility] OR;  Service: Orthopedics;  Laterality: Left;    ROTATOR CUFF REPAIR Left 5/28/2024    Procedure: REPAIR, ROTATOR CUFF;  Surgeon: Mauri Jordan MD;  Location: [facility] OR;  Service: Orthopedics;  Laterality: Left;    SHOULDER ARTHROSCOPY Left 5/28/2024    Procedure: ARTHROSCOPY, SHOULDER;  Surgeon: Mauri Jordan MD;  Location: [facility] OR;  Service: Orthopedics;  Laterality: Left;    SHOULDER SURGERY Right 2020    TONSILLECTOMY  1971        MEDICATIONS:   Current Outpatient Medications:     hydrocodone-acetaminophen (HYCET) solution 7.5-325 mg/15mL, Take by mouth every 6 (six) hours as needed for Pain., Disp: , Rfl:     HYDROcodone-acetaminophen (NORCO)  mg per tablet, Take 1 tablet by mouth every 6 (six) hours  as needed for Pain., Disp: 28 tablet, Rfl: 0    traMADoL (ULTRAM) 50 mg tablet, Take 50 mg by mouth every 6 (six) hours as needed for Pain., Disp: , Rfl:     travoprost (TRAVATAN Z) 0.004 % ophthalmic solution, INSERT 1 DROP INTO AFFECTED EYE(S) EVERY EVENING, Disp: , Rfl:        PHYSICAL EXAM:      GENERAL: Well-developed, well-nourished male . The patient is alert, oriented and cooperative.    EXTREMITIES:  Left shoulder with skin clean dry and intact, minimal swelling and erythema around incision sites, good range of motion of hand wrist and elbow, neurovascularly intact    WOUND: Incisions are clean,dry,intact without signs of infection and healing well      RADIOGRAPHIC FINDINGS:   X-Ray Shoulder 2 or More Views Left    Result Date: 6/7/2024  EXAMINATION: XR SHOULDER COMPLETE 2 OR MORE VIEWS LEFT CLINICAL HISTORY: Pain in left shoulder COMPARISON: Left shoulder x-ray May 28, 2024 TECHNIQUE: Frontal and scapular Y-views of the left shoulder. FINDINGS: Suspect prior distal clavicular resection/acromioplasty.  Overlying skin staples similar to prior.  No significant interval change.     No adverse interval change. Point of Service: [facility] Electronically signed by: Juan Wolff Date:    06/07/2024 Time:    10:20    Patient Active Problem List    Diagnosis Date Noted    Encounter for pre-operative examination 05/20/2024    Traumatic complete tear of left rotator cuff 05/13/2024    Upper respiratory tract infection 03/24/2022    Left otitis media 03/24/2022     IMPRESSION AND PLAN: Patient is status-post Arthroscopy, Shoulder - Left, Debridement, Shoulder, Arthroscopic - Left, Acromioplasty, Arthroscopic - Left, Excision, Clavicle, Distal - Left, and Repair, Rotator Cuff - Left doing well.  All incisional sutures and staples removed today and replaced with Steri-Strips, discussed that these can get wet in the shower in 2-3 days, let them fall off on their own.  Personally reviewed x-ray today with  expected postoperative changes of left shoulder, no acute fracture or dislocation.  Discussed with the patient continuing gentle range-of-motion exercises of hand wrist and elbow.  He is going on a trip later this month, we will go ahead and order physical therapy and work around his out of town schedule for physical therapy.  Okay to begin gentle range-of-motion exercises at 4 weeks postop and increased active range of motion exercises 6 weeks postop.  Follow up with Dr. Jordan in 4 weeks, sooner as needed.      No follow-ups on file.       Su Hanson PA-C      (Subject to voice recognition error, transcription service not allowed)

## 2024-06-11 ENCOUNTER — TELEPHONE (OUTPATIENT)
Dept: ORTHOPEDICS | Facility: CLINIC | Age: 59
End: 2024-06-11
Payer: COMMERCIAL

## 2024-06-11 NOTE — TELEPHONE ENCOUNTER
----- Message from Valarie Smith sent at 6/11/2024 10:13 AM CDT -----  Postop pt has developed a rash on back and on lower extremeties x3 days. Pt is itching as bad as poison ivy. Call [phone].  Who Called: Javier Hinojosa    Caller is requesting assistance/information from provider's office.    Symptoms (please be specific):  RASH  How long has patient had these symptoms: 3 days  List of preferred pharmacies on file (remove unneeded): [unfilled]  If different, enter pharmacy into here including location and phone number:       Preferred Method of Contact: Phone Call  Patient's Preferred Phone Number on File: [phone]   Best Call Back Number, if different:  Additional Information:

## 2024-06-11 NOTE — TELEPHONE ENCOUNTER
Called patient to discuss message about rash. Patient states he has not taken any new medications, ate anything different than normal, or changed any laundry or soap in the home but has a rash that has spread under arms and across back. Patient states he has tried a cream on rash. Recommended patient try Benadryl or Zyrtec over the counter and that does not resolve rash, for patient to see PCP for rash. Patient in agreeance and verbalized understanding.

## 2024-07-22 ENCOUNTER — OFFICE VISIT (OUTPATIENT)
Dept: ORTHOPEDICS | Facility: CLINIC | Age: 59
End: 2024-07-22
Payer: COMMERCIAL

## 2024-07-22 DIAGNOSIS — Z09 POSTOP CHECK: Primary | ICD-10-CM

## 2024-07-22 PROCEDURE — 99999 PR PBB SHADOW E&M-EST. PATIENT-LVL III: CPT | Mod: PBBFAC,,, | Performed by: ORTHOPAEDIC SURGERY

## 2024-07-22 PROCEDURE — 1159F MED LIST DOCD IN RCRD: CPT | Mod: S$GLB,,, | Performed by: ORTHOPAEDIC SURGERY

## 2024-07-22 PROCEDURE — 1160F RVW MEDS BY RX/DR IN RCRD: CPT | Mod: S$GLB,,, | Performed by: ORTHOPAEDIC SURGERY

## 2024-07-22 PROCEDURE — 99024 POSTOP FOLLOW-UP VISIT: CPT | Mod: S$GLB,,, | Performed by: ORTHOPAEDIC SURGERY

## 2024-07-22 NOTE — PROGRESS NOTES
CLINIC NOTE       Chief Complaint   Patient presents with    Left Shoulder - Follow-up        Javier Hinojosa is a 59 y.o. male seen today for recheck of his left shoulder.  He is doing exceedingly well at this time.  He underwent arthroscopic subacromial decompression with open repair of a large retracted rotator cuff tear 7 weeks ago on 05/28 2024.  He was postoperative course has been uncomplicated 1 gradual improvement.  He was undergoing physical therapy efforts to restore motion.  He was states he has no significant pain at this time.      Past Medical History:   Diagnosis Date    Glaucoma      Family History   Problem Relation Name Age of Onset    Cancer Mother Jessica Hinojosa     Diabetes Father Vasiliy Hinojosa     Hypertension Father Vasiliy Hinojosa     Arthritis Father Vasiliy Hinojosa     Arthritis Maternal Grandfather Juan Owen     Arthritis Maternal Grandmother Tray Owen     Diabetes Paternal Grandmother Brooke Hinojosa      Current Outpatient Medications on File Prior to Visit   Medication Sig Dispense Refill    hydrocodone-acetaminophen (HYCET) solution 7.5-325 mg/15mL Take by mouth every 6 (six) hours as needed for Pain.      HYDROcodone-acetaminophen (NORCO)  mg per tablet Take 1 tablet by mouth every 6 (six) hours as needed for Pain. 28 tablet 0    traMADoL (ULTRAM) 50 mg tablet Take 50 mg by mouth every 6 (six) hours as needed for Pain.      travoprost (TRAVATAN Z) 0.004 % ophthalmic solution INSERT 1 DROP INTO AFFECTED EYE(S) EVERY EVENING       No current facility-administered medications on file prior to visit.       ROS     There were no vitals filed for this visit.    Past Surgical History:   Procedure Laterality Date    ADENOIDECTOMY  1971    ARTHROSCOPIC ACROMIOPLASTY OF SHOULDER Left 5/28/2024    Procedure: ACROMIOPLASTY, ARTHROSCOPIC;  Surgeon: Mauri Jordan MD;  Location: [facility];  Service: Orthopedics;  Laterality: Left;    ARTHROSCOPIC DEBRIDEMENT OF SHOULDER  Left 5/28/2024    Procedure: DEBRIDEMENT, SHOULDER, ARTHROSCOPIC;  Surgeon: Mauri Jordan MD;  Location: [facility] OR;  Service: Orthopedics;  Laterality: Left;    DISTAL CLAVICLE EXCISION Left 5/28/2024    Procedure: EXCISION, CLAVICLE, DISTAL;  Surgeon: Mauri Jordan MD;  Location: [facility] OR;  Service: Orthopedics;  Laterality: Left;    ROTATOR CUFF REPAIR Left 5/28/2024    Procedure: REPAIR, ROTATOR CUFF;  Surgeon: Mauir Jordan MD;  Location: [facility] OR;  Service: Orthopedics;  Laterality: Left;    SHOULDER ARTHROSCOPY Left 5/28/2024    Procedure: ARTHROSCOPY, SHOULDER;  Surgeon: Mauri Jordan MD;  Location: [facility] OR;  Service: Orthopedics;  Laterality: Left;    SHOULDER SURGERY Right 2020    TONSILLECTOMY  1971        Review of patient's allergies indicates:  No Known Allergies     Ortho Exam : Left shoulder is normal contour.  Healing incision over the anterior lateral aspect of the shoulder.  He was able to abduct 90° independent of scapulothoracic motion.  No crepitance or instability signs.    Radiographic Examination:    Technique:    Findings:    Impression:   See Above    Assessment and Plan  Patient Active Problem List    Diagnosis Date Noted    Encounter for pre-operative examination 05/20/2024    Traumatic complete tear of left rotator cuff 05/13/2024    Upper respiratory tract infection 03/24/2022    Left otitis media 03/24/2022    Impression:  Status post rotator cuff repair left shoulder.    Plan:  Discussed the healing process and maturation of rotator cuff repair.  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  He was not going to return to any such activities at this time.  We discussed slow progression of usage.  He will finish up his physical therapy efforts.  Recheck as needed      Mauri Jordan M.D.

## 2024-07-23 ENCOUNTER — OFFICE VISIT (OUTPATIENT)
Dept: FAMILY MEDICINE | Facility: CLINIC | Age: 59
End: 2024-07-23
Payer: COMMERCIAL

## 2024-07-23 VITALS
RESPIRATION RATE: 18 BRPM | HEIGHT: 77 IN | DIASTOLIC BLOOD PRESSURE: 92 MMHG | TEMPERATURE: 98 F | HEART RATE: 71 BPM | BODY MASS INDEX: 30.32 KG/M2 | WEIGHT: 256.81 LBS | OXYGEN SATURATION: 97 % | SYSTOLIC BLOOD PRESSURE: 140 MMHG

## 2024-07-23 DIAGNOSIS — Z13.220 SCREENING FOR LIPOID DISORDERS: ICD-10-CM

## 2024-07-23 DIAGNOSIS — Z13.1 SCREENING FOR DIABETES MELLITUS: Primary | ICD-10-CM

## 2024-07-23 DIAGNOSIS — Z00.00 ROUTINE GENERAL MEDICAL EXAMINATION AT A HEALTH CARE FACILITY: ICD-10-CM

## 2024-07-23 DIAGNOSIS — Z12.5 ENCOUNTER FOR SCREENING FOR MALIGNANT NEOPLASM OF PROSTATE: ICD-10-CM

## 2024-07-23 LAB
CHOLEST SERPL-MCNC: 260 MG/DL (ref 0–200)
CHOLEST/HDLC SERPL: 6.5 {RATIO}
EST. AVERAGE GLUCOSE BLD GHB EST-MCNC: 108 MG/DL
GLUCOSE SERPL-MCNC: 87 MG/DL (ref 74–106)
HBA1C MFR BLD HPLC: 5.4 % (ref 4.5–6.6)
HDLC SERPL-MCNC: 40 MG/DL (ref 40–60)
LDLC SERPL CALC-MCNC: 177 MG/DL
LDLC/HDLC SERPL: 4.4 {RATIO}
NONHDLC SERPL-MCNC: 220 MG/DL
PSA SERPL-MCNC: 0.56 NG/ML
TRIGL SERPL-MCNC: 213 MG/DL (ref 35–150)
VLDLC SERPL-MCNC: 43 MG/DL

## 2024-07-23 PROCEDURE — G0103 PSA SCREENING: HCPCS | Mod: ,,, | Performed by: CLINICAL MEDICAL LABORATORY

## 2024-07-23 PROCEDURE — 82947 ASSAY GLUCOSE BLOOD QUANT: CPT | Mod: ,,, | Performed by: CLINICAL MEDICAL LABORATORY

## 2024-07-23 PROCEDURE — 3077F SYST BP >= 140 MM HG: CPT | Mod: ,,, | Performed by: FAMILY MEDICINE

## 2024-07-23 PROCEDURE — 80061 LIPID PANEL: CPT | Mod: ,,, | Performed by: CLINICAL MEDICAL LABORATORY

## 2024-07-23 PROCEDURE — 99396 PREV VISIT EST AGE 40-64: CPT | Mod: ,,, | Performed by: FAMILY MEDICINE

## 2024-07-23 PROCEDURE — 1160F RVW MEDS BY RX/DR IN RCRD: CPT | Mod: ,,, | Performed by: FAMILY MEDICINE

## 2024-07-23 PROCEDURE — 3008F BODY MASS INDEX DOCD: CPT | Mod: ,,, | Performed by: FAMILY MEDICINE

## 2024-07-23 PROCEDURE — 3080F DIAST BP >= 90 MM HG: CPT | Mod: ,,, | Performed by: FAMILY MEDICINE

## 2024-07-23 PROCEDURE — 1159F MED LIST DOCD IN RCRD: CPT | Mod: ,,, | Performed by: FAMILY MEDICINE

## 2024-07-23 PROCEDURE — 83036 HEMOGLOBIN GLYCOSYLATED A1C: CPT | Mod: ,,, | Performed by: CLINICAL MEDICAL LABORATORY

## 2024-07-23 NOTE — PROGRESS NOTES
Health Maintenance Due   Topic Date Due    Hepatitis C Screening  Patient Declined    Lipid Panel  Ordered    HIV Screening  Patient Declined    TETANUS VACCINE  Never done    Hemoglobin A1c (Diabetic Prevention Screening)  Ordered    Colorectal Cancer Screening  Patient Declined     Shingles Vaccine (1 of 2) Patient Declined    COVID-19 Vaccine (1 - 2023-24 season) Patient Declined

## 2024-07-23 NOTE — PATIENT INSTRUCTIONS
"Patient Education       Diet and Health   The Basics   Written by the doctors and editors at [facility]   Why is it important to eat a healthy diet? -- It's important to eat a healthy diet because eating the right foods can keep you healthy now and later on in life.  Which foods are especially healthy? -- Foods that are especially healthy include:  Fruits and vegetables - Eating a diet with lots of fruits and vegetables can help prevent heart disease and strokes. It might also help prevent certain types of cancers. Try to eat fruits and vegetables at each meal and also for snacks. If you don't have fresh fruits and vegetables available, you can eat frozen or canned ones instead. Doctors recommend eating at least 2 1/2 servings of vegetables and 2 servings of fruits each day.  Foods with fiber - Eating foods with a lot of fiber can help prevent heart disease and strokes. If you have type 2 diabetes, it can also help control your blood sugar. Foods that have a lot of fiber include vegetables, fruits, beans, nuts, oatmeal, and whole grain breads and cereals. You can tell how much fiber is in a food by reading the nutrition label (figure 1). Doctors recommend eating 25 to 36 grams of fiber each day.  Foods with folate (also called folic acid) - Folate is a vitamin that is important for pregnant people, since it helps prevent certain birth defects. Anyone who could get pregnant should get at least 400 micrograms of folic acid daily, whether or not they are actively trying to get pregnant. Folate is found in many breakfast cereals, oranges, orange juice, and green leafy vegetables.  Foods with calcium and vitamin D - Babies, children, and adults need calcium and vitamin D to help keep their bones strong. Adults also need calcium and vitamin D to help prevent osteoporosis. Osteoporosis is a condition that causes bones to get "thin" and break more easily than usual. Different foods and drinks have calcium and vitamin D in " "them (figure 2). People who don't get enough calcium and vitamin D in their diet might need to take a supplement.  Foods with protein - Protein helps your muscles stay strong. Healthy foods with a lot of protein include chicken, fish, eggs, beans, nuts, and soy products. Red meat also has a lot of protein, but it also contains fats, which can be unhealthy.  Some experts recommend a "Mediterranean diet." This involves eating a lot of fruits, vegetables, nuts, whole grains, and olive oil. It also includes some fish, poultry, and dairy products, but not a lot of red meat. Eating this way can help your overall health, and might even lower your risk of having a stroke.  What foods should I avoid or limit? -- To eat a healthy diet, there are some things you should avoid or limit. They include:   Fats - There are different types of fats. Some types of fats are better for your body than others.  Trans fats are especially unhealthy. They are found in margarines, many fast foods, and some store-bought baked goods. Trans fats can raise your cholesterol level and your increase your chance of getting heart disease. You should avoid eating foods with these types of fats.  The type of "polyunsaturated" fats found in fish seems to be healthy and can reduce your chance of getting heart disease. Other polyunsaturated fats might also be good for your health. When you cook, it's best to use oils with healthier fats, such as olive oil and canola oil.  Sugar - To have a healthy diet, it's important to limit or avoid sugar, sweets, and refined grains. Refined grains are found in white bread, white rice, most forms of pasta, and most packaged "snack" foods. Whole grains, such as whole-wheat bread and brown rice, have more fiber and are better for your health.  Avoiding sugar-sweetened beverages, like soda and sports drinks, can also help improve your health.  Red meat - Studies have shown that eating a lot of red meat can increase your " "risk of certain health problems, including heart disease and cancer. You should limit the amount of red meat that you eat.  Can I drink alcohol as part of a healthy diet? -- People who drink a small amount of alcohol each day might have a lower chance of getting heart disease. But drinking alcohol can lead to problems. For example, it can raise a person's chances of getting liver disease and certain types of cancers. In women, even 1 drink a day can increase the risk of getting breast cancer.  Most doctors recommend that adult women not have more than 1 drink a day and that adult men not have more than 2 drinks a day. The limits are different because most women's bodies take longer to break down alcohol.  How many calories do I need each day? -- The number of calories you need each day depends on your weight, height, age, sex, and how active you are.  Your doctor or nurse can tell you how many calories you should eat each day. If you are trying to lose weight, you should eat fewer calories each day.  What if I have questions? -- If you have questions about which foods you should or should not eat, ask your doctor or nurse. The right diet for you will depend, in part, on your health and any medical conditions you have.  All topics are updated as new evidence becomes available and our peer review process is complete.  This topic retrieved from [employer] on: Sep 21, 2021.  Topic 36834 Version 20.0  Release: 29.4.2 - C29.263  © 2021 UpToDate, Inc. and/or its affiliates. All rights reserved.  figure 1: Nutrition label - fiber     This is an example of a nutrition label. To figure out how much fiber is in a food, look for the line that says "Dietary Fiber." It's also important to look at the serving size. This food has 7 grams of fiber in each serving, and each serving is 1 cup.  Graphic 79890 Version 7.0    figure 2: Foods and drinks with calcium and vitamin D     Foods rich in calcium include ice cream, soy milk, breads, " "kale, broccoli, milk, cheese, cottage cheese, almonds, yogurt, ready-to-eat cereals, beans, and tofu. Foods rich in vitamin D include milk, fortified plant-based "milks" (soy, almond), canned tuna fish, cod liver oil, yogurt, ready-to-eat-cereals, cooked salmon, canned sardines, mackerel, and eggs. Some of these foods are rich in both.  Graphic 15699 Version 4.0    Consumer Information Use and Disclaimer   This information is not specific medical advice and does not replace information you receive from your health care provider. This is only a brief summary of general information. It does NOT include all information about conditions, illnesses, injuries, tests, procedures, treatments, therapies, discharge instructions or life-style choices that may apply to you. You must talk with your health care provider for complete information about your health and treatment options. This information should not be used to decide whether or not to accept your health care provider's advice, instructions or recommendations. Only your health care provider has the knowledge and training to provide advice that is right for you. The use of this information is governed by the [employer] End User License Agreement, available at https://www.[employer]/en/solutions/[employer]/about/lonnie.The use of [employer] content is governed by the [employer] Terms of Use. ©2021 UpToDate, Inc. All rights reserved.  Copyright   © 2021 UpToDate, Inc. and/or its affiliates. All rights reserved.    "

## 2024-07-23 NOTE — PROGRESS NOTES
Subjective     Patient ID: Javier Hinojosa is a 59 y.o. male.    Chief Complaint: Healthy You (Patient is a 59 year old male presenting today for a Healthy You visit. )    HPI      HPI as noted.  Patient denies any questions, concerns or complaints today.  Patient is healing well from recent shoulder surgery.  Review of Systems   Constitutional:  Negative for chills, diaphoresis and fever.   HENT:  Negative for sore throat.    Eyes:  Negative for visual disturbance.   Respiratory:  Negative for cough and shortness of breath.    Cardiovascular:  Negative for chest pain and palpitations.   Gastrointestinal:  Negative for abdominal pain, diarrhea, nausea and vomiting.   Genitourinary:  Negative for dysuria and hematuria.   Musculoskeletal:  Negative for arthralgias and leg pain.   Integumentary:  Negative for rash.   Neurological:  Negative for dizziness, light-headedness, numbness and headaches.       Tobacco Use: High Risk (7/23/2024)    Patient History     Smoking Tobacco Use: Never     Smokeless Tobacco Use: Current     Passive Exposure: Never     Review of patient's allergies indicates:  No Known Allergies  Current Outpatient Medications   Medication Instructions    hydrocodone-acetaminophen (HYCET) solution 7.5-325 mg/15mL Every 6 hours PRN    HYDROcodone-acetaminophen (NORCO)  mg per tablet 1 tablet, Oral, Every 6 hours PRN    traMADoL (ULTRAM) 50 mg, Every 6 hours PRN    travoprost (TRAVATAN Z) 0.004 % ophthalmic solution INSERT 1 DROP INTO AFFECTED EYE(S) EVERY EVENING     There are no discontinued medications.    Past Medical History:   Diagnosis Date    Glaucoma      Health Maintenance Topics with due status: Not Due       Topic Last Completion Date    Influenza Vaccine Not Due       There is no immunization history on file for this patient.    Objective     Body mass index is 30.45 kg/m².  Wt Readings from Last 3 Encounters:   07/23/24 116.5 kg (256 lb 12.8 oz)   06/07/24 113.4 kg (250 lb)   05/28/24  "113.4 kg (250 lb)     Ht Readings from Last 3 Encounters:   07/23/24 6' 5" (1.956 m)   06/07/24 6' 5" (1.956 m)   05/28/24 6' 5" (1.956 m)     BP Readings from Last 3 Encounters:   07/23/24 (!) 140/92   05/28/24 108/70   05/20/24 118/80     Temp Readings from Last 3 Encounters:   07/23/24 97.8 °F (36.6 °C) (Oral)   05/28/24 97.6 °F (36.4 °C)   05/20/24 97.8 °F (36.6 °C) (Oral)     Pulse Readings from Last 3 Encounters:   07/23/24 71   05/28/24 70   05/20/24 71     Resp Readings from Last 3 Encounters:   07/23/24 18   05/28/24 18   05/20/24 16     PF Readings from Last 3 Encounters:   No data found for PF       Physical Exam  Vitals and nursing note reviewed.   Constitutional:       General: He is not in acute distress.     Appearance: He is not ill-appearing, toxic-appearing or diaphoretic.   HENT:      Head: Normocephalic and atraumatic.      Right Ear: External ear normal.      Left Ear: External ear normal.      Nose: Nose normal.      Mouth/Throat:      Pharynx: No oropharyngeal exudate or posterior oropharyngeal erythema.   Eyes:      General: No scleral icterus.        Right eye: No discharge.         Left eye: No discharge.      Extraocular Movements: Extraocular movements intact.   Neck:      Vascular: No carotid bruit.   Cardiovascular:      Rate and Rhythm: Normal rate and regular rhythm.      Pulses: Normal pulses.      Heart sounds: Normal heart sounds. No murmur heard.     No friction rub. No gallop.   Pulmonary:      Effort: Pulmonary effort is normal. No respiratory distress.      Breath sounds: No stridor. No wheezing, rhonchi or rales.   Chest:      Chest wall: No tenderness.   Abdominal:      Palpations: Abdomen is soft.      Tenderness: There is no abdominal tenderness. There is no guarding.   Musculoskeletal:         General: No swelling.      Right lower leg: No edema.      Left lower leg: No edema.   Skin:     General: Skin is warm and dry.   Neurological:      Mental Status: He is alert and " oriented to person, place, and time.         Assessment and Plan     Problem List Items Addressed This Visit    None  Visit Diagnoses       Screening for diabetes mellitus    -  Primary    Relevant Orders    Hemoglobin A1C    Glucose, Fasting    Screening for lipoid disorders        Relevant Orders    Lipid Panel    Encounter for screening for malignant neoplasm of prostate        Relevant Orders    PSA, Screening    Routine general medical examination at a health care facility                Plan:     Instructed patient to keep appts as scheduled.   Instructed on DASH diet and increased exercise. Recommended at least 150 minutes a week with resistance training as tolerated. Monitor weight and try to lose some.   Instructed on importance of taking all medications as prescribed.   Discussed yearly dental and eye exams.    Discussed use of sun screen, helmets and seat belts.  Gun safety discussed  Sleep discussed  Stay away from tobacco products    Discussed and provided with a screening schedule and personal prevention plan in accordance with USPSTF age appropriate recommendations and screening guidelines.   Education given and reviewed with patient. Counseling and referrals were provided as needed.  After Visit Summary printed and given to patient which includes written education and a list of any referrals if indicated.      F/u plan for yearly AWV.        I have reviewed the medications, allergies, and problem list.     Goal Actions:    What type of visit is the patient here for today?: Healthy You  Does the patient consent to enroll in [facility] Healthy?: Yes  Is this a Wellness Follow Up?: Yes  What is your overall wellness goal? (select at least one): Lifestyle modifications  Choose 3: Exercise, Tobacco, Biometric  Biometric Actions: Attend regularly scheduled office visits  Exercise Actions: Recommend physical activity 30 minutes per day 3-5 times/week  Tobacco Actions: Patient will not stop using tobacco  recommend reducing amt of consumption per day

## 2024-07-24 DIAGNOSIS — E78.5 HYPERLIPIDEMIA, UNSPECIFIED HYPERLIPIDEMIA TYPE: Primary | ICD-10-CM

## 2024-07-24 RX ORDER — ATORVASTATIN CALCIUM 20 MG/1
20 TABLET, FILM COATED ORAL DAILY
Qty: 90 TABLET | Refills: 3 | Status: SHIPPED | OUTPATIENT
Start: 2024-07-24 | End: 2025-07-24

## 2024-07-30 RX ORDER — MELOXICAM 15 MG/1
15 TABLET ORAL DAILY
Qty: 30 TABLET | Refills: 1 | Status: SHIPPED | OUTPATIENT
Start: 2024-07-30

## 2024-07-30 NOTE — TELEPHONE ENCOUNTER
Pt requested a Rx for Meloxicam to take for his left shoulder pain. Pt had surgery on his left shoulder in May and is currently undergoing physical therapy. He reports that the pain is not severe but he is sore after doing PT. He stated that he has been on this medication before with no issues.

## 2024-10-21 PROBLEM — Z09 POSTOP CHECK: Status: RESOLVED | Noted: 2024-07-22 | Resolved: 2024-10-21

## 2024-10-23 ENCOUNTER — PATIENT MESSAGE (OUTPATIENT)
Dept: RESEARCH | Facility: HOSPITAL | Age: 59
End: 2024-10-23

## 2025-01-02 ENCOUNTER — OFFICE VISIT (OUTPATIENT)
Dept: FAMILY MEDICINE | Facility: CLINIC | Age: 60
End: 2025-01-02
Payer: COMMERCIAL

## 2025-01-02 VITALS
OXYGEN SATURATION: 96 % | BODY MASS INDEX: 31.69 KG/M2 | HEIGHT: 77 IN | WEIGHT: 268.38 LBS | RESPIRATION RATE: 20 BRPM | DIASTOLIC BLOOD PRESSURE: 100 MMHG | TEMPERATURE: 102 F | SYSTOLIC BLOOD PRESSURE: 156 MMHG | HEART RATE: 100 BPM

## 2025-01-02 DIAGNOSIS — R05.9 COUGH, UNSPECIFIED TYPE: ICD-10-CM

## 2025-01-02 DIAGNOSIS — J10.1 INFLUENZA A: Primary | ICD-10-CM

## 2025-01-02 DIAGNOSIS — R52 BODY ACHES: ICD-10-CM

## 2025-01-02 DIAGNOSIS — R09.81 SINUS CONGESTION: ICD-10-CM

## 2025-01-02 LAB
CTP QC/QA: YES
CTP QC/QA: YES
MOLECULAR STREP A: NEGATIVE
POC MOLECULAR INFLUENZA A AGN: POSITIVE
POC MOLECULAR INFLUENZA B AGN: NEGATIVE

## 2025-01-02 RX ORDER — AZITHROMYCIN 250 MG/1
TABLET, FILM COATED ORAL
Qty: 6 TABLET | Refills: 0 | Status: SHIPPED | OUTPATIENT
Start: 2025-01-02 | End: 2025-01-07

## 2025-01-02 NOTE — ASSESSMENT & PLAN NOTE
Positive for Influenza A. Discussed use of Tamiflu and he declined. Given that it is near the weekend he is requesting antibiotic be sent in in case symptoms do not improve after course of virus. I sent in Zithromax per his request but advised it would be of no benefit in the viral process.     Reviewed pathology of current symptoms, medication side effects/risk/benefits/directions on taking medications, instructed to alternate OTC Tylenol/Motrin for fever/pain, increase fluid intake, monitor for discussed worsening symptoms that require re-evaluation in clinic or if after hours go to ER. Strict hand hygiene encouraged. Lysol surroundings. Patient verbalized understanding of treatment plan and denies any questions.

## 2025-01-02 NOTE — PROGRESS NOTES
Sudha Kate NP   [facility]  [address]  [place]  [address]      PATIENT NAME: Javier Hinojosa  : 1965  DATE: 25  MRN: [id number]      Billing Provider: Sudha Kate NP  Level of Service: MA OFFICE/OUTPT VISIT, EST, LEVL III, 20-29 MIN  Patient PCP Information       Provider PCP Type    Valentin Gregory DO General            Reason for Visit / Chief Complaint: Fever (Patient presents to clinic for fever ongoing since Tuesday. He has taken Tylenol/ Ibuprofen but has not been effective in treating fever. ), Generalized Body Aches (Patient c/o body aches since yesterday. ), and Cough (Patient present to clinic for cough ongoing since Wednesday. He has done some home remedies but neither are effective. )         History of Present Illness / Problem Focused Workflow     59 year old male presents to clinic with complaints of fever, generalized body aches, and cough.   Fever: Patient presents to clinic for fever ongoing since Tuesday. He has taken Tylenol/ Ibuprofen but as soon as it wears off fever is back.  Generalized Body Aches: Patient c/o body aches since yesterday.   Cough: Patient present to clinic for cough ongoing since Wednesday. He has done some home remedies but neither are effective.         Fever   This is a new problem. The current episode started yesterday. The problem occurs 2 to 4 times per day. The problem has been gradually worsening. The maximum temperature noted was 102 to 102.9 F. The temperature was taken using an oral thermometer. Associated symptoms include coughing, headaches, muscle aches, a sore throat and wheezing. Pertinent negatives include no abdominal pain, chest pain, congestion, diarrhea or ear pain. He has tried NSAIDs, acetaminophen and fluids for the symptoms. The treatment provided moderate relief.   Cough  Associated symptoms include a fever, headaches, a sore throat and wheezing. Pertinent negatives include no chest pain or ear pain.        Review of Systems     @Review of Systems   Constitutional:  Positive for fever.   HENT:  Positive for sore throat. Negative for nasal congestion and ear pain.    Respiratory:  Positive for cough and wheezing.    Cardiovascular:  Negative for chest pain.   Gastrointestinal:  Negative for abdominal pain and diarrhea.   Neurological:  Positive for headaches.       Medical / Social / Family History     Past Medical History:   Diagnosis Date    Glaucoma        Past Surgical History:   Procedure Laterality Date    ADENOIDECTOMY  1971    ARTHROSCOPIC ACROMIOPLASTY OF SHOULDER Left 5/28/2024    Procedure: ACROMIOPLASTY, ARTHROSCOPIC;  Surgeon: Mauri Jordan MD;  Location: [facility] OR;  Service: Orthopedics;  Laterality: Left;    ARTHROSCOPIC DEBRIDEMENT OF SHOULDER Left 5/28/2024    Procedure: DEBRIDEMENT, SHOULDER, ARTHROSCOPIC;  Surgeon: Mauri Jordan MD;  Location: [facility] OR;  Service: Orthopedics;  Laterality: Left;    DISTAL CLAVICLE EXCISION Left 5/28/2024    Procedure: EXCISION, CLAVICLE, DISTAL;  Surgeon: Mauri Jordan MD;  Location: [facility] OR;  Service: Orthopedics;  Laterality: Left;    ROTATOR CUFF REPAIR Left 5/28/2024    Procedure: REPAIR, ROTATOR CUFF;  Surgeon: Mauri Jordan MD;  Location: [facility] OR;  Service: Orthopedics;  Laterality: Left;    SHOULDER ARTHROSCOPY Left 5/28/2024    Procedure: ARTHROSCOPY, SHOULDER;  Surgeon: Mauri Jordan MD;  Location: [facility] OR;  Service: Orthopedics;  Laterality: Left;    SHOULDER SURGERY Right 2020    TONSILLECTOMY  1971       Medications and Allergies     Medications  Outpatient Medications Marked as Taking for the 1/2/25 encounter (Office Visit) with Sudha Kate NP   Medication Sig Dispense Refill    travoprost (TRAVATAN Z) 0.004 % ophthalmic solution INSERT 1 DROP INTO AFFECTED EYE(S) EVERY EVENING         Allergies  Review of patient's allergies indicates:  No Known  Allergies    Physical Examination     Vitals:    01/02/25 0847   BP: (!) 156/100   Pulse:    Resp:    Temp:      Physical Exam  Vitals and nursing note reviewed.   Constitutional:       Appearance: Normal appearance.   HENT:      Head: Normocephalic.      Right Ear: Tympanic membrane normal.      Left Ear: Tympanic membrane normal.      Nose: Congestion and rhinorrhea present. Rhinorrhea is purulent.      Right Turbinates: Pale.      Left Turbinates: Pale.      Right Sinus: Maxillary sinus tenderness and frontal sinus tenderness present.      Left Sinus: Maxillary sinus tenderness and frontal sinus tenderness present.      Mouth/Throat:      Lips: Pink.      Mouth: Mucous membranes are moist.      Pharynx: Oropharyngeal exudate (clear post nasal drainage.) and posterior oropharyngeal erythema present.   Eyes:      Conjunctiva/sclera: Conjunctivae normal.   Cardiovascular:      Rate and Rhythm: Normal rate and regular rhythm.      Pulses: Normal pulses.      Heart sounds: Normal heart sounds.   Pulmonary:      Effort: Pulmonary effort is normal.      Breath sounds: Wheezing present. No rhonchi.   Abdominal:      General: Abdomen is flat. Bowel sounds are normal. There is no distension.      Palpations: Abdomen is soft.      Tenderness: There is no abdominal tenderness.   Musculoskeletal:         General: Normal range of motion.      Cervical back: Normal range of motion.   Skin:     General: Skin is warm and dry.      Capillary Refill: Capillary refill takes less than 2 seconds.   Neurological:      General: No focal deficit present.      Mental Status: He is alert and oriented to person, place, and time. Mental status is at baseline.   Psychiatric:         Mood and Affect: Mood normal.         Behavior: Behavior normal.               Lab Results   Component Value Date    WBC 5.57 05/20/2024    HGB 15.6 05/20/2024    HCT 48.9 05/20/2024    MCV 92.3 05/20/2024     05/20/2024        CMP  Sodium   Date Value Ref  Range Status   05/20/2024 137 136 - 145 mmol/L Final     Potassium   Date Value Ref Range Status   05/20/2024 4.0 3.5 - 5.1 mmol/L Final     Chloride   Date Value Ref Range Status   05/20/2024 105 98 - 107 mmol/L Final     CO2   Date Value Ref Range Status   05/20/2024 27 21 - 32 mmol/L Final     Glucose   Date Value Ref Range Status   05/20/2024 88 74 - 106 mg/dL Final     BUN   Date Value Ref Range Status   05/20/2024 14 7 - 18 mg/dL Final     Creatinine   Date Value Ref Range Status   05/20/2024 1.05 0.70 - 1.30 mg/dL Final     Calcium   Date Value Ref Range Status   05/20/2024 9.5 8.5 - 10.1 mg/dL Final     Anion Gap   Date Value Ref Range Status   05/20/2024 9 7 - 16 mmol/L Final     eGFR   Date Value Ref Range Status   05/20/2024 82 >=60 mL/min/1.73m2 Final     Procedures   Assessment and Plan (including Health Maintenance)   :    Plan:     Problem List Items Addressed This Visit          ID    Influenza A - Primary    Current Assessment & Plan     Positive for Influenza A. Discussed use of Tamiflu and he declined. Given that it is near the weekend he is requesting antibiotic be sent in in case symptoms do not improve after course of virus. I sent in Zithromax per his request but advised it would be of no benefit in the viral process.     Reviewed pathology of current symptoms, medication side effects/risk/benefits/directions on taking medications, instructed to alternate OTC Tylenol/Motrin for fever/pain, increase fluid intake, monitor for discussed worsening symptoms that require re-evaluation in clinic or if after hours go to ER. Strict hand hygiene encouraged. Lysol surroundings. Patient verbalized understanding of treatment plan and denies any questions.         Relevant Orders    POCT Influenza A/B Molecular (Completed)    POCT Strep A, Molecular (Completed)     Other Visit Diagnoses       Cough, unspecified type        Relevant Orders    POCT Influenza A/B Molecular (Completed)    POCT Strep A,  Molecular (Completed)    Body aches        Relevant Orders    POCT Influenza A/B Molecular (Completed)    POCT Strep A, Molecular (Completed)    Sinus congestion        Relevant Medications    azithromycin (Z-CHRIS) 250 MG tablet            Health Maintenance Topics with due status: Not Due       Topic Last Completion Date    Hemoglobin A1c (Diabetic Prevention Screening) 07/23/2024    Lipid Panel 07/23/2024    RSV Vaccine (Age 60+ and Pregnant patients) Not Due       No future appointments.     Health Maintenance Due   Topic Date Due    Hepatitis C Screening  Never done    HIV Screening  Never done    TETANUS VACCINE  Never done    Colorectal Cancer Screening  Never done    Shingles Vaccine (1 of 2) Never done    Pneumococcal Vaccines (Age 50+) (1 of 1 - PCV) Never done    Influenza Vaccine (1) Never done    COVID-19 Vaccine (1 - 2024-25 season) Never done          Signature:  Sudha Kate NP  [address]  [address]    Date of encounter: 1/2/25

## 2025-08-04 ENCOUNTER — PATIENT MESSAGE (OUTPATIENT)
Facility: HOSPITAL | Age: 60
End: 2025-08-04
Payer: COMMERCIAL

## (undated) DEVICE — GOWN NONREINF SET-IN SLV 2XL

## (undated) DEVICE — BUR FORMULA BRL 12 FLUTE 5.5MM

## (undated) DEVICE — APPLICATOR CHLORAPREP ORN 26ML

## (undated) DEVICE — GLOVE SENSICARE PI SURG 7.5

## (undated) DEVICE — CANISTER SUCTION MEDI-VAC 12L

## (undated) DEVICE — FREE NEEDLES

## (undated) DEVICE — GLOVE SENSICARE PI GRN 7.5

## (undated) DEVICE — GLOVE SENSICARE PI GRN 6.5

## (undated) DEVICE — Device

## (undated) DEVICE — GLOVE SENSICARE PI SURG 6.5

## (undated) DEVICE — PROBE SERFAS ENERGY 90S CRSE

## (undated) DEVICE — BLADE SURG #15 CARBON STEEL

## (undated) DEVICE — STAPLER SKIN WIDE

## (undated) DEVICE — SHAVER TOMCAT 4.0

## (undated) DEVICE — HANDLE MEDI-VAC YANKAUER STR

## (undated) DEVICE — GLOVE SENSICARE PI GRN 8

## (undated) DEVICE — DRAPE INCISE IOBAN 2 13X13IN

## (undated) DEVICE — SPONGE COTTON TRAY 4X4IN

## (undated) DEVICE — SUT FIBERTAPE 1.3MM TAILS

## (undated) DEVICE — KIT SHLDR POMIERSKIWATSON RUSH

## (undated) DEVICE — PENCIL ELECTROSURG HOLST W/BLD

## (undated) DEVICE — SUT 2-0 VICRYL / CT-1

## (undated) DEVICE — DEVICE SUCTION H20 BROOM 12FT

## (undated) DEVICE — SOLIDIFIER BOTTLE 1500CC

## (undated) DEVICE — TUBING CROSSFLOW INFLOW CASS

## (undated) DEVICE — SYR 10CC LUER LOCK

## (undated) DEVICE — SOL NACL IRR 3000ML

## (undated) DEVICE — NDL SURGEON MAYO #7 2/PK 72PKS

## (undated) DEVICE — GOWN POLY REINF BRTH SLV 2XL

## (undated) DEVICE — SYR IRRIGATION BULB STER 60ML